# Patient Record
Sex: MALE | NOT HISPANIC OR LATINO | Employment: FULL TIME | URBAN - METROPOLITAN AREA
[De-identification: names, ages, dates, MRNs, and addresses within clinical notes are randomized per-mention and may not be internally consistent; named-entity substitution may affect disease eponyms.]

---
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
## 2019-01-16 ENCOUNTER — OFFICE VISIT - RIVER FALLS (OUTPATIENT)
Dept: FAMILY MEDICINE | Facility: CLINIC | Age: 19
End: 2019-01-16

## 2019-01-16 ASSESSMENT — MIFFLIN-ST. JEOR: SCORE: 1623.14

## 2019-07-26 ENCOUNTER — OFFICE VISIT - RIVER FALLS (OUTPATIENT)
Dept: FAMILY MEDICINE | Facility: CLINIC | Age: 19
End: 2019-07-26

## 2019-07-26 ASSESSMENT — MIFFLIN-ST. JEOR: SCORE: 1546.03

## 2020-01-29 ENCOUNTER — OFFICE VISIT - RIVER FALLS (OUTPATIENT)
Dept: FAMILY MEDICINE | Facility: CLINIC | Age: 20
End: 2020-01-29

## 2020-01-29 ASSESSMENT — MIFFLIN-ST. JEOR: SCORE: 1563.26

## 2021-08-11 ENCOUNTER — OFFICE VISIT - RIVER FALLS (OUTPATIENT)
Dept: FAMILY MEDICINE | Facility: CLINIC | Age: 21
End: 2021-08-11

## 2021-10-20 ENCOUNTER — COMMUNICATION - RIVER FALLS (OUTPATIENT)
Dept: FAMILY MEDICINE | Facility: CLINIC | Age: 21
End: 2021-10-20

## 2021-10-25 ENCOUNTER — COMMUNICATION - RIVER FALLS (OUTPATIENT)
Dept: FAMILY MEDICINE | Facility: CLINIC | Age: 21
End: 2021-10-25

## 2021-10-29 ENCOUNTER — COMMUNICATION - RIVER FALLS (OUTPATIENT)
Dept: FAMILY MEDICINE | Facility: CLINIC | Age: 21
End: 2021-10-29

## 2021-11-01 ENCOUNTER — COMMUNICATION - RIVER FALLS (OUTPATIENT)
Dept: FAMILY MEDICINE | Facility: CLINIC | Age: 21
End: 2021-11-01

## 2021-11-08 ENCOUNTER — OFFICE VISIT - RIVER FALLS (OUTPATIENT)
Dept: FAMILY MEDICINE | Facility: CLINIC | Age: 21
End: 2021-11-08

## 2022-02-12 VITALS
SYSTOLIC BLOOD PRESSURE: 120 MMHG | DIASTOLIC BLOOD PRESSURE: 50 MMHG | TEMPERATURE: 98.2 F | DIASTOLIC BLOOD PRESSURE: 54 MMHG | HEART RATE: 76 BPM | HEART RATE: 64 BPM | SYSTOLIC BLOOD PRESSURE: 90 MMHG | WEIGHT: 149.6 LBS | OXYGEN SATURATION: 97 % | TEMPERATURE: 98.3 F | BODY MASS INDEX: 22.76 KG/M2 | WEIGHT: 145 LBS | HEIGHT: 67 IN | HEIGHT: 67 IN | BODY MASS INDEX: 23.48 KG/M2

## 2022-02-12 VITALS
BODY MASS INDEX: 22.3 KG/M2 | WEIGHT: 142.4 LBS | TEMPERATURE: 96.9 F | BODY MASS INDEX: 23.17 KG/M2 | SYSTOLIC BLOOD PRESSURE: 120 MMHG | DIASTOLIC BLOOD PRESSURE: 64 MMHG | HEIGHT: 67 IN | SYSTOLIC BLOOD PRESSURE: 122 MMHG | WEIGHT: 141.4 LBS | HEART RATE: 76 BPM | WEIGHT: 147.6 LBS | HEART RATE: 76 BPM | HEART RATE: 72 BPM | TEMPERATURE: 97.4 F | DIASTOLIC BLOOD PRESSURE: 60 MMHG | SYSTOLIC BLOOD PRESSURE: 92 MMHG | TEMPERATURE: 97.3 F | BODY MASS INDEX: 22.15 KG/M2 | DIASTOLIC BLOOD PRESSURE: 80 MMHG

## 2022-02-12 VITALS
OXYGEN SATURATION: 97 % | DIASTOLIC BLOOD PRESSURE: 78 MMHG | SYSTOLIC BLOOD PRESSURE: 122 MMHG | WEIGHT: 169.7 LBS | HEART RATE: 77 BPM | BODY MASS INDEX: 26.58 KG/M2

## 2022-02-12 VITALS
WEIGHT: 134.4 LBS | SYSTOLIC BLOOD PRESSURE: 110 MMHG | HEIGHT: 67 IN | BODY MASS INDEX: 21.09 KG/M2 | HEART RATE: 75 BPM | DIASTOLIC BLOOD PRESSURE: 60 MMHG

## 2022-02-12 VITALS
BODY MASS INDEX: 21.91 KG/M2 | HEIGHT: 67 IN | DIASTOLIC BLOOD PRESSURE: 56 MMHG | WEIGHT: 139.6 LBS | TEMPERATURE: 98.6 F | HEART RATE: 60 BPM | SYSTOLIC BLOOD PRESSURE: 110 MMHG

## 2022-02-12 VITALS
BODY MASS INDEX: 22.85 KG/M2 | TEMPERATURE: 98.1 F | HEART RATE: 80 BPM | SYSTOLIC BLOOD PRESSURE: 120 MMHG | DIASTOLIC BLOOD PRESSURE: 62 MMHG | HEIGHT: 67 IN | WEIGHT: 145.6 LBS

## 2022-02-12 VITALS
BODY MASS INDEX: 20.5 KG/M2 | HEART RATE: 70 BPM | DIASTOLIC BLOOD PRESSURE: 62 MMHG | SYSTOLIC BLOOD PRESSURE: 122 MMHG | HEIGHT: 67 IN | WEIGHT: 130.6 LBS

## 2022-02-15 NOTE — PROGRESS NOTES
Patient:   ANTONIA SOUTH            MRN: [id number]            FIN: [id number]               Age:   18 years     Sex:  Male     :  2000   Associated Diagnoses:   Moderate major depression   Author:   Na Torres MD      Chief Complaint   follow up depression      Interval History   patient here for depression follow up  has been feeling like medication is working  looking for a full time job, planning to go back to school fall   has recovered well from recent appendicitis         Health Status   Allergies:    Allergic Reactions (All)  No known allergies  No Known Medication Allergies   Medications:  (Selected)   Prescriptions  Prescribed  FLUoxetine 20 mg oral capsule: = 1 cap(s) ( 20 mg ), PO, Daily, # 30 cap(s), 1 Refill(s), Type: Maintenance, Pharmacy: [id number], 1 cap(s) Oral daily  Wellbutrin  mg/24 hours oral tablet, extended release: = 1 tab(s) ( 150 mg ), Oral, q 24 hrs, # 90 tab(s), 1 Refill(s), Type: Maintenance, Pharmacy: [facility] [id number], 1 tab(s) Oral q 24 hrs   Problem list:    All Problems  Bell's Palsy / ICD-9-.0 / Confirmed  Moderate major depression / SNOMED CT 9948053 / Confirmed      Histories   Past Medical History:    Active  Bell's Palsy (ICD-9-.0)   Family History:    Alive and well  Mother (Jennifer)  Father (Tristin)     Procedure history:    PE tube on 5/10/2006 at 6 Years.  Comments:  2010 11:31 AM CST - Leigh Ann Boudreaux  Left   Social History:        Alcohol Assessment: Denies Alcohol Use            Never      Tobacco Assessment: Denies Tobacco Use            Electronic Cigarettes      Substance Abuse Assessment: Current            Marijuana      Employment and Education Assessment            Student      Home and Environment Assessment            Lives with Father, Mother, Siblings.      Nutrition and Health Assessment            Type of diet: Regular.      Exercise and Physical Activity Assessment: Does not exercise       Physical Examination   Vital Signs   12/10/2018 1:07 PM CST Temperature Tympanic 96.9 DegF  LOW    Peripheral Pulse Rate 76 bpm    Pulse Site Radial artery    HR Method Manual    Systolic Blood Pressure 92 mmHg    Diastolic Blood Pressure 60 mmHg    Mean Arterial Pressure 71 mmHg    BP Site Left arm    BP Method Manual      Measurements from flowsheet : Measurements   12/10/2018 1:07 PM CST   Weight Measured - Standard                141.4 lb     General:  Alert and oriented, No acute distress.    Psychiatric:  Cooperative, Appropriate mood & affect.       Impression and Plan   Diagnosis     Moderate major depression (AHY01-IT F32.1).     Plan:  improving, will stop fluoxetine and continue wellbutrin. Follow up if not getting better..

## 2022-02-15 NOTE — NURSING NOTE
CAGE Assessment Entered On:  1/29/2020 9:20 AM CST    Performed On:  1/29/2020 9:20 AM CST by Kassidy Chicas CMA               Assessment   Have you ever felt you should cut down on your drinking :   No   Have people annoyed you by criticizing your drinking :   No   Have you ever felt bad or guilty about your drinking :   Yes   Have you ever taken a drink first thing in the morning to steady your nerves or get rid of a hangover (Eye-opener) :   No   CAGE Score :   1    Kassidy Chicas CMA - 1/29/2020 9:20 AM CST

## 2022-02-15 NOTE — NURSING NOTE
Generalized Anxiety Disorder Screening Entered On:  1/29/2020 9:21 AM CST    Performed On:  1/29/2020 9:20 AM CST by Kassidy Chicas CMA               Generalized Anxiety Disorder Screening   PERLA Nervous, Anxious On Edge :   Nearly every day   PERLA Control Worrying B :   Nearly every day   PERLA Worrying Too Much :   Nearly every day   PERLA Restless :   Several days   PERLA Easily Annoyed/Irritable :   Not at all   PERLA Afraid :   Not at all   PERLA Trouble Relaxing :   More than half the days   PERLA Total Screening Score :   12    PERLA Difficulty with Work, Home, Others :   Somewhat difficult   Kassidy Chicas CMA - 1/29/2020 9:20 AM CST

## 2022-02-15 NOTE — NURSING NOTE
CAGE Assessment Entered On:  8/15/2021 8:42 AM CDT    Performed On:  8/11/2021 8:41 AM CDT by Flory Saavedra               Assessment   Have you ever felt you should cut down on your drinking :   Yes   Have people annoyed you by criticizing your drinking :   No   Have you ever felt bad or guilty about your drinking :   Yes   Have you ever taken a drink first thing in the morning to steady your nerves or get rid of a hangover (Eye-opener) :   Yes   CAGE Score :   3    Flory Saaevdra - 8/15/2021 8:41 AM CDT

## 2022-02-15 NOTE — PROGRESS NOTES
Patient:   ANTONIA SOUTH            MRN: [id number]            FIN: [id number]               Age:   18 years     Sex:  Male     :  2000   Associated Diagnoses:   Severe major depression   Author:   Na Torres MD      Chief Complaint   2018 1:28 PM CST   Depression Med Check; has not noticed a change        History of Present Illness   patient here for follow up on depression  had felt initially this summer like he was improving, then as school started noticed increasing symptoms  have been getting progressively more disruptive  had to drop out of school this semester  starting counseling next week  has had passing suicidal thoughts but no active plan, feels like he would reach out if needed, no access to firearms or other weapons      Health Status   Allergies:    Allergic Reactions (All)  No known allergies  No Known Medication Allergies   Medications:  (Selected)   Prescriptions  Prescribed  FLUoxetine 40 mg oral capsule: 1 cap(s) ( 40 mg ), Oral, daily, # 90 cap(s), 1 Refill(s), Type: Maintenance, Pharmacy: [id number], 1 cap(s) Oral daily   Problem list:    All Problems  Bell's Palsy / ICD-9-.0 / Confirmed  Moderate major depression / SNOMED CT 4631319 / Confirmed      Histories   Past Medical History:    Active  Bell's Palsy (ICD-9-.0)   Procedure history:    PE tube on 5/10/2006 at 6 Years.  Comments:  2010 11:31 AM - Leigh Ann Boudreaux  Left      Physical Examination   Vital Signs   2018 1:28 PM CST Temperature Tympanic 98.1 DegF    Peripheral Pulse Rate 80 bpm    Pulse Site Radial artery    HR Method Manual    Systolic Blood Pressure 120 mmHg    Diastolic Blood Pressure 62 mmHg    Mean Arterial Pressure 81 mmHg    BP Site Left arm    BP Method Manual      Measurements from flowsheet : Measurements   2018 1:28 PM CST Height Measured - Standard 67 in    Weight Measured - Standard 145.6 lb    BSA 1.77 m2    Body Mass Index 22.8 kg/m2    Body Mass Index  Percentile 48.52      General:  Alert and oriented, Mild distress.    Psychiatric:  cooperative, answers questions appropriately, poor eye contact, affect is flat.       Impression and Plan   Diagnosis     Severe major depression (HNL35-IF F32.2).     Course:  Worsening.    Plan:  will taper fluoxetine and add wellbutrin, follow up in 2-3 weeks.    Orders     Orders (Selected)   Prescriptions  Prescribed  FLUoxetine 20 mg oral capsule: = 1 cap(s) ( 20 mg ), PO, Daily, # 30 cap(s), 1 Refill(s), Type: Maintenance, Pharmacy: [facility] [id number], 1 cap(s) Oral daily  Wellbutrin  mg/24 hours oral tablet, extended release: = 1 tab(s) ( 150 mg ), Oral, q 24 hrs, # 30 tab(s), 1 Refill(s), Type: Maintenance, Pharmacy: [facility] [id number], 1 tab(s) Oral q 24 hrs.

## 2022-02-15 NOTE — NURSING NOTE
Comprehensive Intake Entered On:  1/16/2019 2:00 PM CST    Performed On:  1/16/2019 1:56 PM CST by Kassidy Chicas CMA               Summary   Chief Complaint :   Depression med check; no change noticed   Menstrual Status :   N/A   Weight Measured :   147.6 lb(Converted to: 147 lb 10 oz, 66.95 kg)    Height Measured :   67 in(Converted to: 5 ft 7 in, 170.18 cm)    Body Mass Index :   23.11 kg/m2   Body Surface Area :   1.78 m2   Systolic Blood Pressure :   120 mmHg   Diastolic Blood Pressure :   64 mmHg   Mean Arterial Pressure :   83 mmHg   Peripheral Pulse Rate :   72 bpm   BP Site :   Left arm   Pulse Site :   Radial artery   BP Method :   Manual   HR Method :   Manual   Temperature Tympanic :   97.4 DegF(Converted to: 36.3 DegC)  (LOW)    Kassidy Chicas CMA - 1/16/2019 1:56 PM CST   Health Status   Allergies Verified? :   Yes   Medication History Verified? :   Yes   Immunizations Current :   Yes   Medical History Verified? :   Yes   Pre-Visit Planning Status :   Completed   Tobacco Use? :   Never smoker   Kassidy Chicas CMA - 1/16/2019 1:56 PM CST   Consents   Consent for Immunization Exchange :   Consent Granted   Consent for Immunizations to Providers :   Consent Granted   Kassidy Chcias CMA - 1/16/2019 1:56 PM CST   Meds / Allergies   (As Of: 1/16/2019 2:00:22 PM CST)   Allergies (Active)   No known allergies  Estimated Onset Date:   Unspecified ; Created By:   Dianna Cain CMA; Reaction Status:   Active ; Category:   Drug ; Substance:   No known allergies ; Type:   Allergy ; Updated By:   Dianna Cain CMA; Reviewed Date:   1/16/2019 1:58 PM CST      No Known Medication Allergies  Estimated Onset Date:   Unspecified ; Created By:   Dianna Cain CMA; Reaction Status:   Active ; Category:   Drug ; Substance:   No Known Medication Allergies ; Type:   Allergy ; Updated By:   Dianna Cain CMA; Reviewed Date:   1/16/2019 1:58 PM CST        Medication List   (As Of: 1/16/2019 2:00:22 PM CST)   Prescription/Discharge  Order    buPROPion  :   buPROPion ; Status:   Prescribed ; Ordered As Mnemonic:   Wellbutrin  mg/24 hours oral tablet, extended release ; Simple Display Line:   150 mg, 1 tab(s), Oral, q 24 hrs, 90 tab(s), 1 Refill(s) ; Ordering Provider:   Na Torres MD; Catalog Code:   buPROPion ; Order Dt/Tm:   12/10/2018 1:13:22 PM          FLUoxetine  :   FLUoxetine ; Status:   Prescribed ; Ordered As Mnemonic:   FLUoxetine 20 mg oral capsule ; Simple Display Line:   20 mg, 1 cap(s), PO, Daily, 30 cap(s), 1 Refill(s) ; Ordering Provider:   Na Torres MD; Catalog Code:   FLUoxetine ; Order Dt/Tm:   11/23/2018 1:43:47 PM

## 2022-02-15 NOTE — PROGRESS NOTES
Patient:   ANTONIA SOUTH            MRN: [id number]            FIN: [id number]               Age:   21 years     Sex:  Male     :  2000   Associated Diagnoses:   Moderate major depression; Atypical depression   Author:   Na Torres MD      Chief Complaint   2021 8:36 AM CST    VIDEO VISIT - Follow-Up, Pt needs a psych referral.     video visit: patient at home  provider in office  started 848 am  ended 901 am  consent for video visit documented as noted      Interval History   patient with increased depression symptoms  has been drinking alcohol for treatment/self medication  drinking daily 6-7 drinks  seeing a counselor, just resumed in person, seen in [place], notes that was helpful  still working every day, not missing work  feels like work is helpful and distracting  has been talking with parents about his depression, they are supportive and checking on him regularly  notes symptoms have been worse for the past 6 months  had been tried on several medications as noted in history (fluoxetine, buspar, bupropion) without good effective  tolerating duloxetine, currently on 60mg daily  has tried to get scheduled with psychiatry in this area ([facility], [place]) but having a hard time finding someone who will take new patients  does not feel like he needs hospitalization or intensive treatment at this time         Health Status   Allergies:    Allergic Reactions (All)  No known allergies  No Known Medication Allergies   Medications:  (Selected)   Prescriptions  Prescribed  DULoxetine 30 mg oral delayed release capsule: = 1 cap(s) ( 30 mg ), Oral, bid, Instructions: take 1 caps daily x 1 week, then increase to 2 caps daily, # 180 cap(s), 1 Refill(s), Type: Maintenance, Pharmacy: [facility] #[id number], 1 cap(s) Oral bid,Instr:take 1 caps daily x 1 week, then inc...  Wellbutrin  mg/24 hours oral tablet, extended release: = 1 tab(s) ( 300 mg ), Oral, q 24 hrs, # 90 tab(s), 3 Refill(s),  Type: Maintenance, Pharmacy: [facility] #[id number], 1 tab(s) Oral q 24 hrs  busPIRone 15 mg oral tablet: = 1 tab(s) ( 15 mg ), Oral, bid, # 180 tab(s), 3 Refill(s), Type: Maintenance, Pharmacy: [facility] #[id number], 1 tab(s) Oral bid   Problem list:    All Problems  Bell's Palsy / ICD-9-.0 / Confirmed  Moderate major depression / SNOMED CT 3618278 / Confirmed  Tobacco user / SNOMED CT 310823744 / Probable      Histories   Past Medical History:    Active  Bell's Palsy (ICD-9-.0)   Family History:    Alive and well  Mother (Jennifer)  Father (Tristin)     Procedure history:    PE tube on 5/10/2006 at 6 Years.  Comments:  12/29/2010 11:31 AM KIKI - Leigh Ann Boudreaux   Social History:        Electronic Cigarette/Vaping Assessment            Electronic Cigarette Use: Use, within last 90 days.  Type: Nicotine infused.  1-25 inhales/day Use Per Day.      Alcohol Assessment: Denies Alcohol Use            Never      Tobacco Assessment: Denies Tobacco Use            Electronic Cigarettes            Smoker, current status unknown            Smoker, current status unknown      Substance Abuse Assessment: Current            Marijuana      Employment and Education Assessment            Student      Home and Environment Assessment            Lives with Father, Mother, Siblings.      Nutrition and Health Assessment            Type of diet: Regular.      Exercise and Physical Activity Assessment: Does not exercise        Physical Examination   Eye:  patient is alert, demonstrates good insight, contracts for safety, affect is flat.    Psychiatric:  Cooperative.       Impression and Plan   Diagnosis     Moderate major depression (IMH71-OB F32.1).     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     Course:  Poor response to treatment.    Plan:  will trial increase in duloxetine to 90mg daily while awaiting psychiatry referral, follow up with me in 2-3 weeks by video visit. Will ask [facility] to touch base with patient and  confirm that appointment gets scheduled with psychiatry. Patient will reach out to me if suicidal thoughts increase or he has concerns for his safety..    Orders     Orders (Selected)   Outpatient Orders  Ordered  Referral (Request): 11/08/21 8:59:00 CST, Referred to: Psychiatry, Referred to: depression not responding to medication, Moderate major depression  Atypical depression  Prescriptions  Prescribed  DULoxetine 30 mg oral delayed release capsule: See Instructions, Instructions: 2 cap(s) po qam and 1 cap po apm, # 90 tab(s), 5 Refill(s), Type: Maintenance, Pharmacy: [facility] #[id number], 2 cap(s) po qam and 1 cap po apm, 67, in, 01/29/20 8:56:00 CST, Height Measured, 169.7, lb, 08/11/21....

## 2022-02-15 NOTE — NURSING NOTE
CAGE Assessment Entered On:  7/26/2019 9:34 AM CDT    Performed On:  7/26/2019 9:34 AM CDT by Kassidy Chicas CMA               Assessment   Have you ever felt you should cut down on your drinking :   No   Have people annoyed you by criticizing your drinking :   No   Have you ever felt bad or guilty about your drinking :   No   Have you ever taken a drink first thing in the morning to steady your nerves or get rid of a hangover (Eye-opener) :   No   CAGE Score :   0    Kassidy Chicas CMA - 7/26/2019 9:34 AM CDT

## 2022-02-15 NOTE — LETTER
(Inserted Image. Unable to display)   July 16, 2019      ANTONIA SOUTH  [address] [place] [id number]        Dear ANTONIA,      Thank you for selecting [facility] (previously [facility]) for your healthcare needs.     Our records indicate you are due for the following services:     Medication Check    To schedule an appointment or if you have further questions, please contact your primary clinic:   [facility]          [phone]   [facility]    [phone]             [facility]         [phone]      Powered by [employer]    Sincerely,    Na Torres M.D.

## 2022-02-15 NOTE — NURSING NOTE
Generalized Anxiety Disorder Screening Entered On:  1/16/2019 2:57 PM CST    Performed On:  1/16/2019 2:56 PM CST by Kassidy Chicas CMA               Generalized Anxiety Disorder Screening   PERLA Nervous, Anxious On Edge :   Nearly every day   PERLA Control Worrying B :   Several days   PERLA Worrying Too Much :   Nearly every day   PERLA Restless :   Several days   PERLA Easily Annoyed/Irritable :   Several days   PERLA Afraid :   More than half the days   PERLA Trouble Relaxing :   Several days   PERLA Total Screening Score :   12    Kassidy Chicas CMA - 1/16/2019 2:56 PM CST

## 2022-02-15 NOTE — NURSING NOTE
CAGE Assessment Entered On:  1/16/2019 2:56 PM CST    Performed On:  1/16/2019 2:56 PM CST by Kassidy Chicas CMA               Assessment   Have you ever felt you should cut down on your drinking :   No   Have people annoyed you by criticizing your drinking :   No   Have you ever felt bad or guilty about your drinking :   No   Have you ever taken a drink first thing in the morning to steady your nerves or get rid of a hangover (Eye-opener) :   No   CAGE Score :   0    Kassidy Chicas CMA - 1/16/2019 2:56 PM CST

## 2022-02-15 NOTE — PROGRESS NOTES
Patient:   ANTONIA SOUTH            MRN: [id number]            FIN: [id number]               Age:   18 years     Sex:  Male     :  2000   Associated Diagnoses:   Moderate major depression; Depression with anxiety   Author:   Na Torres MD      Chief Complaint   2019 1:56 PM CST    Depression med check; no change noticed      Interval History   here for follow up depression and anxiety  feels like anxiety has been worse  racing thoughts, feels edgy, worries excessively  no trouble with sleeping         Health Status   Allergies:    Allergic Reactions (All)  No known allergies  No Known Medication Allergies   Medications:  (Selected)   Prescriptions  Prescribed  Wellbutrin  mg/24 hours oral tablet, extended release: = 1 tab(s) ( 150 mg ), Oral, q 24 hrs, # 90 tab(s), 1 Refill(s), Type: Maintenance, Pharmacy: [id number], 1 tab(s) Oral q 24 hrs   Problem list:    All Problems  Moderate major depression / SNOMED CT 6509410 / Confirmed  Bell's Palsy / ICD-9-.0 / Confirmed      Histories   Past Medical History:    Active  Bell's Palsy (ICD-9-.0)   Family History:    Alive and well  Mother (Jennifer)  Father (Tristin)     Procedure history:    PE tube on 5/10/2006 at 6 Years.  Comments:  2010 11:31 AM CST - Leigh Ann Boudreaux   Social History:        Alcohol Assessment: Denies Alcohol Use            Never      Tobacco Assessment: Denies Tobacco Use            Electronic Cigarettes      Substance Abuse Assessment: Current            Marijuana      Employment and Education Assessment            Student      Home and Environment Assessment            Lives with Father, Mother, Siblings.      Nutrition and Health Assessment            Type of diet: Regular.      Exercise and Physical Activity Assessment: Does not exercise      Physical Examination   Vital Signs   2019 1:56 PM CST Temperature Tympanic 97.4 DegF  LOW    Peripheral Pulse Rate 72 bpm    Pulse Site Radial  artery    HR Method Manual    Systolic Blood Pressure 120 mmHg    Diastolic Blood Pressure 64 mmHg    Mean Arterial Pressure 83 mmHg    BP Site Left arm    BP Method Manual      Measurements from flowsheet : Measurements   1/16/2019 1:56 PM CST Height Measured - Standard 67 in    Weight Measured - Standard 147.6 lb    BSA 1.78 m2    Body Mass Index 23.11 kg/m2    Body Mass Index Percentile 51.42      General:  Alert and oriented, No acute distress.    Psychiatric:  Cooperative, Appropriate mood & affect.    PHQ9 and GAD7 reviewed      Impression and Plan   Diagnosis     Moderate major depression (NSP38-SO F32.1).     Depression with anxiety (YBT79-QE F41.8).     Plan:  discussed option of increasing dose of wellbutrin vs adding something targeting anxiety. Requested second medication. Start buspar 7.5mg bid, call in one week and will up further to either tid dosing or 15mg bid depending on response..

## 2022-02-15 NOTE — NURSING NOTE
Depression Screening Entered On:  1/29/2020 9:20 AM CST    Performed On:  1/29/2020 9:20 AM CST by Kassidy Chicas CMA               Depression Screening   Little Interest - Pleasure in Activities :   More than half the days   Feeling Down, Depressed, Hopeless :   Nearly every day   Initial Depression Screen Score :   5    Trouble Falling or Staying Asleep :   Several days   Feeling Tired or Little Energy :   More than half the days   Poor Appetite or Overeating :   Nearly every day   Feeling Bad About Yourself :   Nearly every day   Trouble Concentrating :   Several days   Moving or Speaking Slowly :   More than half the days   Thoughts Better Off Dead or Hurting Self :   Not at all   Detailed Depression Screen Score :   12    Total Depression Screen Score :   17    PERLA Difficulty with Work, Home, Others :   Somewhat difficult   Kassidy Chicas CMA - 1/29/2020 9:20 AM CST

## 2022-02-15 NOTE — TELEPHONE ENCOUNTER
---------------------  From: Myriam South RN (Phone Messages Pool ([id number]_[place]))   To: HUNTER Message Pool ([id number]_[facility]);     Sent: 10/20/2021 12:00:15 PM CDT  Subject: General Message-referral        PCP:  HUNTER      Time of Call:  11:54am       Person Calling:  pt  Phone number:  [phone]    Note:   Pt returning call from Nery.     Chart reviewed; pt informed HUNTER placed a referral for psychiatry and can print a list of psychiatric options.    Pt requested to have a print out left at the  and will  when able.    Please assist with print out.    Last office visit and reason:  8/11/21 depression mgmt Edita @

## 2022-02-15 NOTE — NURSING NOTE
Comprehensive Intake Entered On:  1/29/2020 9:00 AM CST    Performed On:  1/29/2020 8:56 AM CST by Kassidy Chicas CMA               Summary   Chief Complaint :   Depression/Anxiety Med Check; last month was hard   Menstrual Status :   N/A   Weight Measured :   134.4 lb(Converted to: 134 lb 6 oz, 60.96 kg)    Height Measured :   67 in(Converted to: 5 ft 7 in, 170.18 cm)    Body Mass Index :   21.05 kg/m2   Body Surface Area :   1.7 m2   Systolic Blood Pressure :   110 mmHg   Diastolic Blood Pressure :   60 mmHg   Mean Arterial Pressure :   77 mmHg   Peripheral Pulse Rate :   75 bpm   BP Method :   Manual   HR Method :   Electronic   Kassidy Chicas CMA - 1/29/2020 8:56 AM CST   Health Status   Allergies Verified? :   Yes   Medication History Verified? :   Yes   Immunizations Current :   Yes   Medical History Verified? :   Yes   Pre-Visit Planning Status :   Completed   Tobacco Use? :   Current every day smoker   Kassidy Chicas CMA - 1/29/2020 8:56 AM CST   Consents   Consent for Immunization Exchange :   Consent Granted   Consent for Immunizations to Providers :   Consent Granted   Kassidy Chicas CMA - 1/29/2020 8:56 AM CST   Meds / Allergies   (As Of: 1/29/2020 9:00:13 AM CST)   Allergies (Active)   No known allergies  Estimated Onset Date:   Unspecified ; Created By:   Dianna Cain CMA; Reaction Status:   Active ; Category:   Drug ; Substance:   No known allergies ; Type:   Allergy ; Updated By:   Dianna Cain CMA; Reviewed Date:   1/29/2020 8:58 AM CST      No Known Medication Allergies  Estimated Onset Date:   Unspecified ; Created By:   Dianna Cain CMA; Reaction Status:   Active ; Category:   Drug ; Substance:   No Known Medication Allergies ; Type:   Allergy ; Updated By:   Dianna Cain CMA; Reviewed Date:   1/29/2020 8:58 AM CST        Medication List   (As Of: 1/29/2020 9:00:13 AM CST)   Prescription/Discharge Order    buPROPion  :   buPROPion ; Status:   Prescribed ; Ordered As Mnemonic:   Wellbutrin   mg/24 hours oral tablet, extended release ; Simple Display Line:   150 mg, 1 tab(s), Oral, q 24 hrs, 90 tab(s), 3 Refill(s) ; Ordering Provider:   Na Torres MD; Catalog Code:   buPROPion ; Order Dt/Tm:   7/26/2019 9:20:21 AM CDT          busPIRone  :   busPIRone ; Status:   Prescribed ; Ordered As Mnemonic:   busPIRone 10 mg oral tablet ; Simple Display Line:   10 mg, 1 tab(s), Oral, bid, 180 tab(s), 3 Refill(s) ; Ordering Provider:   Na Torres MD; Catalog Code:   busPIRone ; Order Dt/Tm:   7/26/2019 9:20:42 AM CDT

## 2022-02-15 NOTE — PROGRESS NOTES
Patient:   ANTONIA SOUTH            MRN: [id number]            FIN: [id number]               Age:   18 years     Sex:  Male     :  2000   Associated Diagnoses:   Moderate major depression   Author:   Na Torres MD      Chief Complaint   2018 5:27 PM CDT    F/U Depression; medication seems to be working really well        Interval History   here for follow up depression  overall working well, improved but not resolved  reviewed PHQ9      Health Status   Allergies:    Allergic Reactions (All)  No known allergies  No Known Medication Allergies   Medications:  (Selected)   Prescriptions  Completed  FLUoxetine 20 mg oral capsule: 1 cap(s) ( 20 mg ), PO, Daily, # 30 cap(s), 1 Refill(s), Type: Hard Stop, Pharmacy: [facility] [id number]   Problem list:    All Problems  Bell's Palsy / ICD-9-.0 / Confirmed      Histories   Past Medical History:    Active  Bell's Palsy (ICD-9-.0)   Family History:    Alive and well  Mother (Jennifer)  Father (Tristin)     Procedure history:    PE tube on 5/10/2006 at 6 Years.  Comments:  2010 11:31 AM - Leigh Ann Boudreaux  Left   Social History:        Alcohol Assessment: Denies Alcohol Use            Never      Tobacco Assessment: Denies Tobacco Use            Electronic Cigarettes      Substance Abuse Assessment: Current            Marijuana      Employment and Education Assessment            Student      Home and Environment Assessment            Lives with Father, Mother, Siblings.      Nutrition and Health Assessment            Type of diet: Regular.      Exercise and Physical Activity Assessment: Does not exercise        Physical Examination   Vital Signs   2018 5:27 PM CDT Temperature Tympanic 98.2 DegF    Peripheral Pulse Rate 64 bpm    Pulse Site Radial artery    HR Method Manual    Systolic Blood Pressure 90 mmHg    Diastolic Blood Pressure 54 mmHg  LOW    Mean Arterial Pressure 66 mmHg    BP Site Left arm    BP Method Manual      Measurements from  flowsheet : Measurements   7/24/2018 5:27 PM CDT Height Measured - Standard 67 in    Weight Measured - Standard 145 lb    BSA 1.76 m2    Body Mass Index 22.71 kg/m2    Body Mass Index Percentile 49.68      General:  Alert and oriented, No acute distress.    Psychiatric:  Cooperative, Appropriate mood & affect, Normal judgment, Non-suicidal.       Impression and Plan   Diagnosis     Moderate major depression (IPU05-OT F32.1).     Course:  Improving.    Plan:  will increase to 40mg, follow up by phone in 30 days. Recheck in clinic in 6 months.    Orders     Orders (Selected)   Prescriptions  Prescribed  FLUoxetine 40 mg oral capsule: 1 cap(s) ( 40 mg ), Oral, daily, # 90 cap(s), 1 Refill(s), Type: Maintenance, Pharmacy: [id number], 1 cap(s) Oral daily.

## 2022-02-15 NOTE — NURSING NOTE
Depression Screening Entered On:  7/26/2019 9:34 AM CDT    Performed On:  7/26/2019 9:33 AM CDT by Kassidy Chicas CMA               Depression Screening   Little Interest - Pleasure in Activities :   More than half the days   Feeling Down, Depressed, Hopeless :   Several days   Initial Depression Screen Score :   3    Trouble Falling or Staying Asleep :   Several days   Feeling Tired or Little Energy :   More than half the days   Poor Appetite or Overeating :   Nearly every day   Feeling Bad About Yourself :   More than half the days   Trouble Concentrating :   Several days   Moving or Speaking Slowly :   Several days   Thoughts Better Off Dead or Hurting Self :   Not at all   Detailed Depression Screen Score :   10    Total Depression Screen Score :   13    PERLA Difficulty with Work, Home, Others :   Somewhat difficult   Kassidy Chicas CMA - 7/26/2019 9:33 AM CDT

## 2022-02-15 NOTE — PROGRESS NOTES
Patient:   ANTONIA SOUTH            MRN: [id number]            FIN: [id number]               Age:   21 years     Sex:  Male     :  2000   Associated Diagnoses:   Moderate major depression   Author:   Na Torres MD      Chief Complaint   2021 9:45 AM CDT    Depression medication check. Stopped both medications 1 year ago.      Interval History   patient here for depression follow up  not taking medication  symptoms are severe  has not seen therapist in a while  would like to try alternate medication  denies active suicidal thoughts  did not feel like bupropion or buspar were effective  discussed option of psychiatry referral, does not want to proceed at this time but will consider in the future  will start back in therapy         Health Status   Allergies:    Allergic Reactions (All)  No known allergies  No Known Medication Allergies   Medications:  (Selected)   Prescriptions  Prescribed  Wellbutrin  mg/24 hours oral tablet, extended release: = 1 tab(s) ( 300 mg ), Oral, q 24 hrs, # 90 tab(s), 3 Refill(s), Type: Maintenance, Pharmacy: [facility] #[id number], 1 tab(s) Oral q 24 hrs  busPIRone 15 mg oral tablet: = 1 tab(s) ( 15 mg ), Oral, bid, # 180 tab(s), 3 Refill(s), Type: Maintenance, Pharmacy: [facility] #[id number], 1 tab(s) Oral bid   Problem list:    All Problems  Bell's Palsy / ICD-9-.0 / Confirmed  Moderate major depression / SNOMED CT 1439140 / Confirmed  Tobacco user / SNOMED CT 015436925 / Probable      Histories   Past Medical History:    Active  Bell's Palsy (ICD-9-.0)   Family History:    Alive and well  Mother (Jennifer)  Father (Tristin)     Procedure history:    PE tube on 5/10/2006 at 6 Years.  Comments:  2010 11:31 AM CST - Leigh Ann Boudreaux  Left   Social History:        Electronic Cigarette/Vaping Assessment            Electronic Cigarette Use: Use, within last 90 days.  Type: Nicotine infused.  1-25 inhales/day Use Per Day.      Alcohol Assessment:  Denies Alcohol Use            Never      Tobacco Assessment: Denies Tobacco Use            Electronic Cigarettes            Smoker, current status unknown            Smoker, current status unknown      Substance Abuse Assessment: Current            Marijuana      Employment and Education Assessment            Student      Home and Environment Assessment            Lives with Father, Mother, Siblings.      Nutrition and Health Assessment            Type of diet: Regular.      Exercise and Physical Activity Assessment: Does not exercise        Physical Examination   Vital Signs   8/11/2021 9:45 AM CDT Peripheral Pulse Rate 77 bpm    Systolic Blood Pressure 122 mmHg    Diastolic Blood Pressure 78 mmHg    Mean Arterial Pressure 93 mmHg    BP Site Right arm    BP Method Manual    Oxygen Saturation 97 %      Measurements from flowsheet : Measurements   8/11/2021 9:45 AM CDT    Weight Measured - Standard                169.7 lb        Impression and Plan   Diagnosis     Moderate major depression (CBM10-ZL F32.1).     Course:  Worsening.    Plan:  needs to restart medication, will trial duloxetine, follow up in 3-4 weeks, sooner if woresning, restart therapy.    Orders     Orders (Selected)   Prescriptions  Prescribed  DULoxetine 30 mg oral delayed release capsule: = 1 cap(s) ( 30 mg ), Oral, bid, Instructions: take 1 caps daily x 1 week, then increase to 2 caps daily, # 60 cap(s), 1 Refill(s), Type: Maintenance, Pharmacy: [facility] #[id number], 1 cap(s) Oral bid,Instr:take 1 caps daily x 1 week, then incr....

## 2022-02-15 NOTE — PROGRESS NOTES
Patient:   ANTONIA SOUTH            MRN: [id number]            FIN: [id number]               Age:   17 years     Sex:  Male     :  2000   Associated Diagnoses:   Right inguinal pain   Author:   Curt Hammond PA-C      Subjective   Chief complaint 2017 3:26 PM CDT    Lump in groin, swelling and sore; has happened on two seperate occasions  .     Right inguinal region.       Health Status   Allergies:    Allergic Reactions (All)  No known allergies   Problem list:    All Problems  Bell's Palsy / ICD-9-.0 / Confirmed      Objective   Vital Signs   2017 3:26 PM CDT Temperature Temporal 98.6 DegF    Peripheral Pulse Rate 60 bpm    Pulse Site Radial artery    HR Method Manual    Systolic Blood Pressure 110 mmHg    Diastolic Blood Pressure 56 mmHg  LOW    Mean Arterial Pressure 74 mmHg    BP Site Left arm    BP Method Manual      Measurements from flowsheet : Measurements   2017 3:26 PM CDT Height Measured - Standard 67 in    Weight Measured - Standard 139.6 lb    BSA 1.73 m2    Body Mass Index 21.86 kg/m2    Body Mass Index Percentile 47.34      Genitourinary:  No costovertebral angle tenderness, No scrotal tenderness.    Lymphatics:  Inguinal tenderness right inferior with 4mm soft tissue area of fullness. No genital lesions or rashes..       Impression and Plan   Assessment and Plan:          Diagnosis: Right inguinal pain (NWH18-MU R10.30).    Orders     Orders (Selected)   Outpatient Orders  Ordered  US Inguinal (Request): Priority: Routine, Right inguinal pain.     .    FU after US>

## 2022-02-15 NOTE — TELEPHONE ENCOUNTER
---------------------  From: Soila Sterling RN (Phone Messages Pool ([id number]_[place]))   To: [facility] Message Pool ([id number]);     Sent: 10/25/2021 3:12:40 PM CDT  Subject: Psyc referral     Time of Call:  1308  Return call at:_     Person Calling:  Patient  Phone number:  [phone]    Note:   Patient is wondering who to see for psyc referral. He must not have picked up the information at the front for him. Message left to call back at his number to clarify. Printed list of mental health providers is a put at front for  today.    Last office visit and reason:  8/11/21 Depression---------------------  From: Na Torres MD ([employer] Message Pool ([id number]_[facility]))   To: Na Torres MD;     Sent: 10/26/2021 1:12:49 PM CDT  Subject: FW: Psyc referralLeft message for patient. Will mail information to him today.

## 2022-02-15 NOTE — LETTER
(Inserted Image. Unable to display)   June 19, 2019      ANTONIA SOUTH  [address] [place] [id number]        Dear ANTONIA,      Thank you for selecting [facility] (previously Former[facility]) for your healthcare needs.     Our records indicate you are due for the following services:     Annual Physical    To schedule an appointment or if you have further questions, please contact your primary clinic:   [facility]          [phone]   Counts include 234 beds at the [facility]    [phone]             [facility]         [phone]      Powered by [employer]    Sincerely,    Na Torres M.D.

## 2022-02-15 NOTE — TELEPHONE ENCOUNTER
Entered by Rylee Hughes MA on January 28, 2019 2:55:42 PM CST  ---------------------  From: Rylee Hughes MA   To: [facility] [id number]    Sent: 1/28/2019 2:55:42 PM CST  Subject: Medication Management     ** Not Approved: Patient should contact Prescriber first **  FLUoxetine (FLUOXETINE 20MG CAPSULES)  TAKE 1 CAPSULE BY MOUTH DAILY  Qty:  30 cap(s)        Days Supply:  30        Refills:  0          MICHAEL     Route To Pharmacy - [facility] [id number]   Signed by Rylee Hughes MA            ------------------------------------------  From: [facility] [id number]  To: Na Torres MD  Sent: January 26, 2019 1:40:39 PM CST  Subject: Medication Management  Due: January 27, 2019 1:40:39 PM CST    ** On Hold Pending Signature **  Drug: FLUoxetine (FLUoxetine 20 mg oral capsule)  1 CAP(S) ORAL DAILY  Quantity: 30 cap(s)     Days Supply: 0         Refills: 0  Substitutions Allowed  Notes from Pharmacy:     Dispensed Drug: FLUoxetine (FLUoxetine 20 mg oral capsule)  TAKE 1 CAPSULE BY MOUTH DAILY  Quantity: 30 cap(s)     Days Supply: 30        Refills: 0  Substitutions Allowed  Notes from Pharmacy:   ------------------------------------------

## 2022-02-15 NOTE — TELEPHONE ENCOUNTER
---------------------  From: Kaye Robin LPN (Phone Messages Pool ([id number]))   To: [employer] Message Pool ([id number]_[facility]);     Sent: 10/20/2021 10:00:24 AM CDT  Subject: psychiatry referral     Phone Message    PCP:   HUNTER      Time of Call:  9:18am       Person Calling:  pt  Phone number:  [phone]    Note:   Pt LM asking for psychiatry referral/list of names. Pt says it was discussed at his last visit.    Last office visit and reason:  8/11/21 Depression Disease Management---------------------  From: Nery Osullivan ([employer] Message Pool ([id number]_[facility]))   To: Na Torres MD;     Sent: 10/20/2021 10:11:21 AM CDT  Subject: FW: psychiatry referral     Please advise  ** Submitted: **  Order:Referral (Request)  Details:  10/20/2021 10:57 AM CDT, Referred to: Psychiatry, Referred to: depression not responding to medication, Moderate major depression  Atypical depression         Signed by Na Torres MD  10/20/2021 3:57:00 PM [facility] placed referral. If he wants to try to schedule, we can give him the printout list of psychiatric options.---------------------  From: Na Torres MD   To: [employer] Message Pool ([id number]_[facility]);     Sent: 10/20/2021 10:58:30 AM CDT  Subject: RE: psychiatry referralLVMTCB.

## 2022-02-15 NOTE — PROGRESS NOTES
Patient:   ANTONIA SOUTH            MRN: [id number]            FIN: [id number]               Age:   18 years     Sex:  Male     :  2000   Associated Diagnoses:   Physical exam; Moderate major depression   Author:   Na Torres MD      Chief Complaint   2018 4:57 PM CDT    Physical      Well Adult History   Patient here for annual exam  Generally healh has been good throughout his life  Just finished freshman year of college  Concerns about mental health  PHQ9 reviewed, suspicious for depression  Patient notes that he feels like symptoms have been coming on for several years but worse through the past year  Mom had issues with depression as a teenager but wasn't treated  Patient tried counseling at college but wasn't helpful  Has not tried medication and is willing to start         Review of Systems   ROS reviewed as documented in chart      Health Status   Allergies:    Allergic Reactions (All)  No known allergies   Medications: no daily medications   Problem list:    All Problems  Bell's Palsy / ICD-9-.0 / Confirmed      Histories   Past Medical History:    Active  Bell's Palsy (ICD-9-.0)   Family History:    Alive and well  Mother (Jennifer)  Father (Tristin)     Procedure history:    PE tube on 5/10/2006 at 6 Years.  Comments:  2010 11:31 AM - Leigh Ann Boudreaux  Left   Social History:        Tobacco Assessment: No Risk            Household tobacco concerns: No.      Employment and Education Assessment            Student      Home and Environment Assessment            Lives with Father, Mother, Siblings.        Physical Examination   Vital Signs   2018 4:57 PM CDT Temperature Tympanic 98.3 DegF    Peripheral Pulse Rate 76 bpm    Pulse Site Radial artery    HR Method Manual    Systolic Blood Pressure 120 mmHg    Diastolic Blood Pressure 50 mmHg  LOW    Mean Arterial Pressure 73 mmHg    BP Site Right arm    BP Method Manual    Oxygen Saturation 97 %      Measurements from flowsheet :  Measurements   6/19/2018 4:57 PM CDT Height Measured - Standard 67 in    Weight Measured - Standard 149.6 lb    BSA 1.79 m2    Body Mass Index 23.43 kg/m2    Body Mass Index Percentile 59.28      General:  Alert and oriented, No acute distress.    Eye:  Pupils are equal, round and reactive to light, Normal conjunctiva.    HENT:  Normocephalic, Tympanic membranes are clear, Normal hearing, No pharyngeal erythema.    Neck:  Supple, Non-tender, No lymphadenopathy, No thyromegaly.    Respiratory:  Lungs are clear to auscultation, Respirations are non-labored, Breath sounds are equal.    Cardiovascular:  Normal rate, Regular rhythm.    Gastrointestinal:  Soft, Non-tender, Non-distended, Normal bowel sounds, No organomegaly.    Musculoskeletal:  Normal range of motion, No deformity.    Integumentary:  Warm, Dry.    Neurologic:  Alert, Oriented.       Health Maintenance      Recommendations     Pending (in the next year)        Due            Alcohol Misuse Screen (Male) due  06/19/18  and every 1  year(s)           Depression Screen (Male) due  06/19/18  and every 1  year(s)           HIV Screen (if sexually active) (Male) due  06/19/18  and every 1  year(s)           STD Counseling (if sexually active) (Male) due  06/19/18  and every 1  year(s)           Syphilis Screen (if sexually active) (Male) due  06/19/18  and every 1  year(s)     Satisfied (in the past 1 year)        Satisfied            Body Mass Index Check (Male) on  06/19/18.           High Blood Pressure Screen (Male) on  06/19/18.           Tobacco Use Screen (Male) on  06/19/18.        Impression and Plan   Diagnosis     Physical exam (SYG10-LG Z00.00).     Course:  Progressing as expected.    Patient Instructions:       Counseled: Patient, Regarding diagnosis, exercise, healthy eating, sleep habits.    Diagnosis     Moderate major depression (HHV45-NB F32.1).     Course:  counseled regarding diagnosis treatment, denies suicidal thoughts, will follow up if  symptoms are worsening.    Plan:  will follow up in clinic in 3-4 weeks for recheck.    Orders     Orders (Selected)   Prescriptions  Prescribed  FLUoxetine 20 mg oral capsule: 1 cap(s) ( 20 mg ), PO, Daily, # 30 cap(s), 1 Refill(s), Type: Maintenance, Pharmacy: [id number], 1 cap(s) po daily.

## 2022-02-15 NOTE — TELEPHONE ENCOUNTER
---------------------  From: Rylee Hughes MA ([employer] Pool ([id number]_[facility]))   To: Na Torres MD;     Sent: 1/28/2019 8:10:46 AM CST  Subject: FW: Medication Management   Due Date/Time: 1/27/2019 1:40:00 PM CST     PCP:   CHT    Medication:   Fluoxetine  Last Filled:  11/23/18   Quantity:  30 Refills:  1    Date of last office visit and reason:   1/16/19  Date of last labs pertaining to condition:  n/a    Note:  Medication was completed of patients medication list.     Return to Clinic order placed?  Yes        ------------------------------------------  From: Jack Erwin [id number]  To: Na Torres MD  Sent: January 26, 2019 1:40:39 PM CST  Subject: Medication Management  Due: January 27, 2019 1:40:39 PM CST    ** On Hold Pending Signature **  Drug: FLUoxetine (FLUoxetine 20 mg oral capsule)  1 CAP(S) ORAL DAILY  Quantity: 30 cap(s)     Days Supply: 0         Refills: 0  Substitutions Allowed  Notes from Pharmacy:     Dispensed Drug: FLUoxetine (FLUoxetine 20 mg oral capsule)  TAKE 1 CAPSULE BY MOUTH DAILY  Quantity: 30 cap(s)     Days Supply: 30        Refills: 0  Substitutions Allowed  Notes from Pharmacy:   ------------------------------------------Please call patient. I have wellbutrin and buspar as his two current medications.---------------------  From: Na Torres MD   To: eRx Pool ([id number]_[facility]);     Sent: 1/28/2019 8:29:37 AM CST  Subject: RE: Medication ManagementCalled and left message for patient to clarify.

## 2022-02-15 NOTE — PROGRESS NOTES
Chief Complaint    Depression/Anxiety Med Check; last month was hard  History of Present Illness      patient with increased depression and anxiety symptoms over the past month      overall felt like bupropion has helped      buspirone seems to help somewhat but often forgets second dose      back in classes this week, finds school anxiety provoking but also feels like it is an accomplishment to be in school      PHQ9 and GAD7 reviewed as noted in chart  Physical Exam   Vitals & Measurements    HR: 75(Peripheral)  BP: 110/60     HT: 67 in  WT: 134.4 lb  BMI: 21.05       alert and cooperative, no distress  Assessment/Plan       1. Moderate major depression (F32.1)         will trial increase in both medications        bupropion 300mg daily        buspirone trial increase to 15mg twice a day        should consider counseling        follow up in one month if symptoms not controlled        10/15 minutes in counseling about medications, self-care                Orders:         buPROPion, = 1 tab(s) ( 300 mg ), Oral, q 24 hrs, # 90 tab(s), 3 Refill(s), Type: Maintenance, Pharmacy: [facility] #[id number], 1 tab(s) Oral q 24 hrs, (Ordered)         busPIRone, = 1 tab(s) ( 15 mg ), Oral, bid, # 180 tab(s), 3 Refill(s), Type: Maintenance, Pharmacy: [facility] #[id number], 1 tab(s) Oral bid, (Ordered)         influenza virus vaccine, inactivated, 0.5 mL, IM, once, (Completed)         tetanus/diphth/pertuss (Tdap) adult/adol, 0.5 mL, im, once, (Completed)         Return to Clinic (Request), RFV: Px and Anxiety/depression Med check, Return in 1 year  Patient Information     Name:ANTONIA SOUTH      Address:      [address] [place] [id number]     Sex:Male     YOB: 2000     [phone]     Emergency Contact:MARTHA SOUTH     MRN:[id number]     FIN:[id number]     Location:[facility]     Date of Service:01/29/2020      Primary Care Physician:       Felix CHAN,  [place], [phone]      Attending Physician:       Brian CHAN, Na, [phone]  Problem List/Past Medical History    Ongoing     Bell's Palsy     Moderate major depression    Historical     No qualifying data  Procedure/Surgical History     PE tube (05/10/2006)      Comments: Left.  Medications    busPIRone 15 mg oral tablet, 15 mg= 1 tab(s), Oral, bid, 3 refills    Wellbutrin  mg/24 hours oral tablet, extended release, 300 mg= 1 tab(s), Oral, q 24 hrs, 3 refills  Allergies    No Known Medication Allergies    No known allergies  Social History    Smoking Status - 01/29/2020     Current every day smoker     Alcohol - Denies Alcohol Use, 06/25/2018      Never, 06/25/2018     Employment/School      Student, 12/29/2010     Exercise - Does not exercise, 06/25/2018     Home/Environment      Lives with Father, Mother, Siblings., 12/29/2010     Nutrition/Health      Type of diet: Regular., 06/25/2018     Substance Abuse - Current, 06/25/2018      Marijuana, 06/25/2018     Tobacco - Denies Tobacco Use, 06/25/2018      Electronic Cigarettes, 06/25/2018  Family History    Alive and well: Mother and Father.  Immunizations      Vaccine Date Status          influenza virus vaccine, inactivated 01/29/2020 Given          tetanus/diphth/pertuss (Tdap) adult/adol 01/29/2020 Given          human papillomavirus vaccine 07/24/2018 Given          Hep A, pediatric/adolescent 05/17/2017 Given          Hep A, pediatric/adolescent 10/22/2015 Given              Comments : [10/22/2015] Dad consented Upper arm          meningococcal conjugate vaccine 10/22/2015 Given              Comments : [10/22/2015] Pt consented Lower arm          influenza (LAIV) 10/23/2014 Recorded          varicella 11/10/2013 Recorded          influenza (LAIV) 10/24/2013 Recorded          influenza (LAIV) 10/17/2012 Recorded          meningococcal conjugate vaccine 08/18/2011 Given          influenza 08/18/2011 Given          influenza (LAIV)  11/03/2010 Given          varicella 09/13/2010 Given              Comments : Pt father gave verbal consent.          Td 03/03/2010 Recorded          influenza, H1N1, live 12/07/2009 Recorded          DTaP 08/09/2004 Recorded          MMR (measles/mumps/rubella) 08/09/2004 Recorded          IPV 08/09/2004 Recorded          varicella 11/10/2003 Recorded          pneumococcal (PCV7) 08/10/2001 Recorded          pneumococcal (PCV7) 01/30/2001 Recorded          DTaP 07/24/2000 Recorded          IPV 07/24/2000 Recorded          MMR (measles/mumps/rubella) 02/08/2000 Recorded          Hib (HbOC) 02/08/2000 Recorded          DTaP 1999 Recorded          Hep B 1999 Recorded          Hib (HbOC) 1999 Recorded          DTaP 1999 Recorded          Hib (HbOC) 1999 Recorded          IPV 1999 Recorded          DTaP 1999 Recorded          Hep B 1999 Recorded          Hib (HbOC) 1999 Recorded          IPV 1999 Recorded          Hep B 1999 Recorded

## 2022-02-15 NOTE — NURSING NOTE
Comprehensive Intake Entered On:  8/11/2021 9:51 AM CDT    Performed On:  8/11/2021 9:45 AM CDT by Lucie Real CMA               Summary   Chief Complaint :   Depression medication check. Stopped both medications 1 year ago.    Menstrual Status :   N/A   Weight Measured :   169.7 lb(Converted to: 169 lb 11 oz, 76.975 kg)    Systolic Blood Pressure :   122 mmHg   Diastolic Blood Pressure :   78 mmHg   Mean Arterial Pressure :   93 mmHg   Peripheral Pulse Rate :   77 bpm   BP Site :   Right arm   BP Method :   Manual   Oxygen Saturation :   97 %   Lucie Real CMA - 8/11/2021 9:45 AM CDT   Health Status   Allergies Verified? :   Yes   Medication History Verified? :   Yes   Immunizations Current :   Yes   Medical History Verified? :   Yes   Pre-Visit Planning Status :   Completed   Tobacco Use? :   Current every day smoker   Lucie Real CMA - 8/11/2021 9:45 AM CDT   Consents   Consent for Immunization Exchange :   Consent Granted   Consent for Immunizations to Providers :   Consent Granted   Lucie Real CMA - 8/11/2021 9:45 AM CDT   Meds / Allergies   (As Of: 8/11/2021 9:51:35 AM CDT)   Allergies (Active)   No known allergies  Estimated Onset Date:   Unspecified ; Created By:   Dianna Cain CMA; Reaction Status:   Active ; Category:   Drug ; Substance:   No known allergies ; Type:   Allergy ; Updated By:   Dianna Cain CMA; Reviewed Date:   8/11/2021 9:48 AM CDT      No Known Medication Allergies  Estimated Onset Date:   Unspecified ; Created By:   Dianna Cain CMA; Reaction Status:   Active ; Category:   Drug ; Substance:   No Known Medication Allergies ; Type:   Allergy ; Updated By:   Dianna Cain CMA; Reviewed Date:   8/11/2021 9:48 AM CDT        Medication List   (As Of: 8/11/2021 9:51:35 AM CDT)   Prescription/Discharge Order    buPROPion  :   buPROPion ; Status:   Prescribed ; Ordered As Mnemonic:   Wellbutrin  mg/24 hours oral tablet, extended release ; Simple Display Line:   300 mg, 1 tab(s),  Oral, q 24 hrs, 90 tab(s), 3 Refill(s) ; Ordering Provider:   Na Torres MD; Catalog Code:   buPROPion ; Order Dt/Tm:   1/29/2020 9:11:37 AM CST          busPIRone  :   busPIRone ; Status:   Prescribed ; Ordered As Mnemonic:   busPIRone 15 mg oral tablet ; Simple Display Line:   15 mg, 1 tab(s), Oral, bid, 180 tab(s), 3 Refill(s) ; Ordering Provider:   Na Torres MD; Catalog Code:   busPIRone ; Order Dt/Tm:   1/29/2020 9:14:21 AM CST            Social History   Social History   (As Of: 8/11/2021 9:51:35 AM CDT)   Alcohol:  Denies Alcohol Use      Never   (Last Updated: 6/25/2018 11:48:54 AM CDT by Leigh Ann Boudreaux)          Tobacco:  Denies Tobacco Use      Electronic Cigarettes   (Last Updated: 6/25/2018 11:50:08 AM CDT by Leigh Ann Boudreaux)   Smoker, current status unknown   (Last Updated: 8/11/2021 9:48:56 AM CDT by Lucie Real CMA)   Smoker, current status unknown   (Last Updated: 8/11/2021 9:49:33 AM CDT by Lucie Real CMA)          Electronic Cigarette/Vaping:        Electronic Cigarette Use: Use, within last 90 days.  Type: Nicotine infused.  1-25 inhales/day Use Per Day.   (Last Updated: 8/11/2021 9:49:23 AM CDT by Lucie Real CMA)          Substance Abuse:  Current      Marijuana   (Last Updated: 6/25/2018 11:49:12 AM CDT by Leigh Ann Boudreaux)          Employment/School:        Student   (Last Updated: 12/29/2010 11:22:11 AM CST by Leigh Ann Boudreaux)          Home/Environment:        Lives with Father, Mother, Siblings.   (Last Updated: 12/29/2010 11:22:36 AM CST by Leigh Ann Boudreaux)          Nutrition/Health:        Type of diet: Regular.   (Last Updated: 6/25/2018 11:49:24 AM CDT by Leigh Ann Boudreaux)          Exercise:  Does not exercise      (Last Updated: 6/25/2018 11:49:17 AM CDT by Leigh Ann Boudreaux )

## 2022-02-15 NOTE — TELEPHONE ENCOUNTER
---------------------  From: Soila Sterling RN (Phone Messages Pool ([id number]_[place]))   To: [facility] Message Pool ([id number]_[facility]);     Sent: 11/1/2021 9:25:02 AM CDT  Subject: Depression symptoms     Time of Call:  0832  Return call at:0917     Person Calling:  Stanton Crow  Phone number:  [phone]    Note:   Dad wanted to share some information with [facility] regarding Emanuel. Emanuel is having depression. Dad feels his depression is severe. Dad is very worried about it and feels he may hurt himself.New RTC order entered. Routing to provider.---------------------  From: Kevin Cheung LPN ([facility] Message Pool ([id number]_[facility]))   To: Na Torres MD;     Sent: 11/1/2021 11:25:41 AM CDT  Subject: FW: Depression symptomsSpoke with dad at 1255pm  Reviewed that without patient permission I can't discuss patient history, etc. but I can receive information.  Dad notes that patient stated that he had had thoughts of self harm but would not act on them.    Patient is scheduled to see me next week.   Will watch for morning cancellations and if there are any will reach out to see if he wants to be seen sooner.  If dad has concerns between now and then, they should reach out and we will work him in.

## 2022-02-15 NOTE — NURSING NOTE
Comprehensive Intake Entered On:  11/8/2021 8:41 AM CST    Performed On:  11/8/2021 8:36 AM CST by Kevin Cheung LPN               Summary   Chief Complaint :   VIDEO VISIT - Follow-Up, Pt needs a psych referral.   Menstrual Status :   N/A   Kevin Cheung LPN - 11/8/2021 8:36 AM CST   Consents   Consent for Immunization Exchange :   Consent Granted   Consent for Immunizations to Providers :   Consent Granted   Kevin Cheung LPN - 11/8/2021 8:36 AM CST   Meds / Allergies   (As Of: 11/8/2021 8:41:06 AM CST)   Allergies (Active)   No known allergies  Estimated Onset Date:   Unspecified ; Created By:   Dianna Cain CMA; Reaction Status:   Active ; Category:   Drug ; Substance:   No known allergies ; Type:   Allergy ; Updated By:   Dianna Cain CMA; Reviewed Date:   11/8/2021 8:36 AM CST      No Known Medication Allergies  Estimated Onset Date:   Unspecified ; Created By:   Dianna Cain CMA; Reaction Status:   Active ; Category:   Drug ; Substance:   No Known Medication Allergies ; Type:   Allergy ; Updated By:   Dianna Cain CMA; Reviewed Date:   11/8/2021 8:36 AM CST        Medication List   (As Of: 11/8/2021 8:41:06 AM CST)   Prescription/Discharge Order    buPROPion  :   buPROPion ; Status:   Prescribed ; Ordered As Mnemonic:   Wellbutrin  mg/24 hours oral tablet, extended release ; Simple Display Line:   300 mg, 1 tab(s), Oral, q 24 hrs, 90 tab(s), 3 Refill(s) ; Ordering Provider:   Na Torres MD; Catalog Code:   buPROPion ; Order Dt/Tm:   1/29/2020 9:11:37 AM CST          busPIRone  :   busPIRone ; Status:   Prescribed ; Ordered As Mnemonic:   busPIRone 15 mg oral tablet ; Simple Display Line:   15 mg, 1 tab(s), Oral, bid, 180 tab(s), 3 Refill(s) ; Ordering Provider:   Na Torres MD; Catalog Code:   busPIRone ; Order Dt/Tm:   1/29/2020 9:14:21 AM CST          DULoxetine  :   DULoxetine ; Status:   Prescribed ; Ordered As Mnemonic:   DULoxetine 30 mg oral delayed release capsule ; Simple  Display Line:   30 mg, 1 cap(s), Oral, bid, take 1 caps daily x 1 week, then increase to 2 caps daily, 180 cap(s), 1 Refill(s) ; Ordering Provider:   Na Torres MD; Catalog Code:   DULoxetine ; Order Dt/Tm:   9/20/2021 10:46:55 AM CDT            Social History   Social History   (As Of: 11/8/2021 8:41:06 AM CST)   Alcohol:  Denies Alcohol Use      Never   (Last Updated: 6/25/2018 11:48:54 AM CDT by Leigh Ann Boudreaux)          Tobacco:  Denies Tobacco Use      Electronic Cigarettes   (Last Updated: 6/25/2018 11:50:08 AM CDT by Leigh Ann Boudreaux)   Smoker, current status unknown   (Last Updated: 8/11/2021 9:48:56 AM CDT by Lucie Real CMA)   Smoker, current status unknown   (Last Updated: 8/11/2021 9:49:33 AM CDT by Lucie Real CMA)          Electronic Cigarette/Vaping:        Electronic Cigarette Use: Use, within last 90 days.  Type: Nicotine infused.  1-25 inhales/day Use Per Day.   (Last Updated: 8/11/2021 9:49:23 AM CDT by Lucie Real CMA)          Substance Abuse:  Current      Marijuana   (Last Updated: 6/25/2018 11:49:12 AM CDT by Leigh Ann Boudreaux)          Employment/School:        Student   (Last Updated: 12/29/2010 11:22:11 AM CST by Leigh Ann Boudreaux)          Home/Environment:        Lives with Father, Mother, Siblings.   (Last Updated: 12/29/2010 11:22:36 AM CST by Leigh Ann Boudreaux)          Nutrition/Health:        Type of diet: Regular.   (Last Updated: 6/25/2018 11:49:24 AM CDT by Leigh Ann Boudreaux)          Exercise:  Does not exercise      (Last Updated: 6/25/2018 11:49:17 AM CDT by Leigh Ann Boudreaux )

## 2022-02-15 NOTE — LETTER
(Inserted Image. Unable to display)   January 29, 2021      ANTONIA SOUTH  [address] [place] [id number]        Dear ANTONIA,      Thank you for selecting [facility] (previously [facility]) for your healthcare needs.     Our records indicate you are due for the following services:     Annual Physical  Medication Check    (FYI   Regarding office visits: In some instances, a video visit or telephone visit may be offered as an option.)      To schedule an appointment or if you have further questions, please contact your clinic at [phone].      Powered by [employer]    Sincerely,    Na Torres M.D.

## 2022-02-15 NOTE — LETTER
(Inserted Image. Unable to display)   September 09, 2021  ANTONIA SOUTH  [address] [place] [address]        Dear ANTONIA,    Thank you for selecting [facility] for your healthcare needs.    Our records indicate you are due for the following services:     Follow-up office visit      (FYI   Regarding office visits: In some instances, a video visit or telephone visit may be offered as an option.)    To schedule an appointment or if you have further questions, please contact your clinic at [phone].    Powered by [employer]    Sincerely,    aN Torres MD

## 2022-02-15 NOTE — PROGRESS NOTES
Patient:   ANTONIA SOUTH            MRN: [id number]            FIN: [id number]               Age:   18 years     Sex:  Male     :  2000   Associated Diagnoses:   Appendicitis   Author:   Trenton Washington MD      Preoperative Information   Indication for surgery:  Acute abdomen.    Accompanied by:  No one.    Source of history:  Self.           Chief Complaint   2018 1:36 PM CST    Right sided lower abdominal pain x few hours     Chief complaint and symptoms noted above confirmed with patient.   Awoke with generalized abdominal pain and now worse with localization to RLQ.      Review of Systems   Constitutional:  Negative.    Eye:  Negative.    Ear/Nose/Mouth/Throat:  Negative.    Respiratory:  Negative.    Cardiovascular:  Negative.    Gastrointestinal:  Nausea, No vomiting, No diarrhea, No constipation, No heartburn.         Abdominal pain: Right, Lower quadrant, The severity is moderate, Characterized as ( Cramping/colicky ).    Genitourinary:  Negative.    Hematology/Lymphatics:  Negative.    Endocrine:  Negative.    Immunologic:  Negative.    Musculoskeletal:  Negative.    Integumentary:  Negative.    Neurologic:  Negative.    Psychiatric:  Depression, controlled.       Health Status   Allergies:    Allergic Reactions (Selected)  No known allergies  No Known Medication Allergies   Medications:  (Selected)   Prescriptions  Prescribed  FLUoxetine 20 mg oral capsule: = 1 cap(s) ( 20 mg ), PO, Daily, # 30 cap(s), 1 Refill(s), Type: Maintenance, Pharmacy: [id number], 1 cap(s) Oral daily  FLUoxetine 40 mg oral capsule: 1 cap(s) ( 40 mg ), Oral, daily, # 90 cap(s), 1 Refill(s), Type: Maintenance, Pharmacy: [id number], 1 cap(s) Oral daily  Wellbutrin  mg/24 hours oral tablet, extended release: = 1 tab(s) ( 150 mg ), Oral, q 24 hrs, # 30 tab(s), 1 Refill(s), Type: Maintenance, Pharmacy: [id number], 1 tab(s) Oral q 24 hrs   Problem list:    All  Problems  Bell's Palsy / ICD-9-.0 / Confirmed  Moderate major depression / SNOMED CT 1173719 / Confirmed      Histories   Past Medical History:    Active  Bell's Palsy (ICD-9-.0)   Family History:    Alive and well  Mother (Jennifer)  Father (Tristin)     Procedure history:    PE tube on 5/10/2006 at 6 Years.  Comments:  12/29/2010 11:31 AM CST - Janusz , Leigh Ann  Left   Social History:        Alcohol Assessment: Denies Alcohol Use            Never      Tobacco Assessment: Denies Tobacco Use            Electronic Cigarettes      Substance Abuse Assessment: Current            Marijuana      Employment and Education Assessment            Student      Home and Environment Assessment            Lives with Father, Mother, Siblings.      Nutrition and Health Assessment            Type of diet: Regular.      Exercise and Physical Activity Assessment: Does not exercise     LAST ATE OR DRANK  12/4/2018    Has no history of anemia.  Has no history of DVT or pulmonary embolism.  Has no personal history of bleeding problems.   Has no personal or family history of anesthesia reactions.  Patient does not have active tuberculosis.    S/he has not taken aspirin or aspirin containing products in the last week.     S/he has not taken Plavix (Clopidogrel) in the last 2 weeks.    S/he has not taken warfarin in the past week.    S/he has not been on corticosteroids for more than 2 weeks recently.      S/he is not DNR before, during or after surgery.    Chest pain / SOB walking up 2 flights of steps? NO  Pain in neck or jaw? NO  CAD MI?  NO  A fib? NO  Heart Failure? NO  Asthma  or Bronchitis? NO   Diabetes? NO       Insulin/Orals?   Seizure Disorder? NO  Chronic Kidney Disease? NO  Thyroid Disease? NO  Liver Disease NO  CVA? NO         Physical Examination   Vital Signs   12/5/2018 1:36 PM CST Temperature Tympanic 97.3 DegF  LOW    Peripheral Pulse Rate 76 bpm    Pulse Site Radial artery    HR Method Manual    Systolic Blood  Pressure 122 mmHg    Diastolic Blood Pressure 80 mmHg    Mean Arterial Pressure 94 mmHg    BP Site Right arm    BP Method Manual      General:  Alert and oriented, No acute distress.    Eye:  Normal conjunctiva.    HENT:  Normocephalic, Tympanic membranes are clear.    Neck:  Supple, Non-tender.    Respiratory:  Lungs are clear to auscultation, Respirations are non-labored.    Cardiovascular:  Normal rate, Regular rhythm, No murmur.    Gastrointestinal:          Abdomen: Right, Lower quadrant, Tenderness, Rebound tenderness, No guarding, Not rigid.         Bowel sounds: Diminished.    Genitourinary:  No costovertebral angle tenderness, No scrotal tenderness.    Lymphatics:  No lymphadenopathy neck, axilla, groin.    Musculoskeletal:  Normal range of motion, Normal strength.    Integumentary:  Warm, Dry, Pink.    Neurologic:  Alert, Oriented, Normal sensory, Normal motor function.    Psychiatric:  Cooperative, Appropriate mood & affect, Normal judgment, Non-suicidal.       Review / Management         Results review:  Lab results: 12/5/2018 2:06 PM CST    WBC TR                    22.5 x10^3/uL  .       Impression and Plan   Diagnosis     Appendicitis (GIF39-EC K37).     Condition:  Stable.    Orders     Arranged for father to come down and take him to [facility] c/o Dr. Dalton.  Dr. Dalton aware he is on his way..

## 2022-02-15 NOTE — NURSING NOTE
Comprehensive Intake Entered On:  7/26/2019 9:09 AM CDT    Performed On:  7/26/2019 9:05 AM CDT by Kassidy Chicas CMA               Summary   Chief Complaint :   follow-up depression; things are going good; wart on left foot   Menstrual Status :   N/A   Weight Measured :   130.6 lb(Converted to: 130 lb 10 oz, 59.24 kg)    Height Measured :   67 in(Converted to: 5 ft 7 in, 170.18 cm)    Body Mass Index :   20.45 kg/m2   Body Surface Area :   1.67 m2   Systolic Blood Pressure :   122 mmHg   Diastolic Blood Pressure :   62 mmHg   Mean Arterial Pressure :   82 mmHg   Peripheral Pulse Rate :   70 bpm   BP Method :   Manual   HR Method :   Manual   Kassidy Chicas CMA - 7/26/2019 9:05 AM CDT   Health Status   Allergies Verified? :   Yes   Medication History Verified? :   Yes   Immunizations Current :   Yes   Medical History Verified? :   Yes   Pre-Visit Planning Status :   Completed   Tobacco Use? :   Never smoker   Kassidy Chicas CMA - 7/26/2019 9:05 AM CDT   Consents   Consent for Immunization Exchange :   Consent Granted   Consent for Immunizations to Providers :   Consent Granted   Kassidy Chicas CMA - 7/26/2019 9:05 AM CDT   Meds / Allergies   (As Of: 7/26/2019 9:09:28 AM CDT)   Allergies (Active)   No known allergies  Estimated Onset Date:   Unspecified ; Created By:   Dianna Cain CMA; Reaction Status:   Active ; Category:   Drug ; Substance:   No known allergies ; Type:   Allergy ; Updated By:   Dianna Cain CMA; Reviewed Date:   7/26/2019 9:06 AM CDT      No Known Medication Allergies  Estimated Onset Date:   Unspecified ; Created By:   Dianna Cain CMA; Reaction Status:   Active ; Category:   Drug ; Substance:   No Known Medication Allergies ; Type:   Allergy ; Updated By:   Dianna Cain CMA; Reviewed Date:   7/26/2019 9:06 AM CDT        Medication List   (As Of: 7/26/2019 9:09:28 AM CDT)   Prescription/Discharge Order    buPROPion  :   buPROPion ; Status:   Prescribed ; Ordered As Mnemonic:   Wellbutrin  mg/24  hours oral tablet, extended release ; Simple Display Line:   150 mg, 1 tab(s), Oral, q 24 hrs, 90 tab(s), 1 Refill(s) ; Ordering Provider:   Na Torres MD; Catalog Code:   buPROPion ; Order Dt/Tm:   12/10/2018 1:13:22 PM          busPIRone  :   busPIRone ; Status:   Prescribed ; Ordered As Mnemonic:   busPIRone 7.5 mg oral tablet ; Simple Display Line:   7.5 mg, 1 tab(s), Oral, bid, 60 tab(s), 5 Refill(s) ; Ordering Provider:   Na Torres MD; Catalog Code:   busPIRone ; Order Dt/Tm:   1/16/2019 2:07:41 PM

## 2022-02-15 NOTE — NURSING NOTE
Depression Screening Entered On:  1/16/2019 2:56 PM CST    Performed On:  1/16/2019 2:55 PM CST by Kassidy Chicas CMA               Depression Screening   Feeling Down, Depressed, Hopeless :   More than half the days   Little Interest - Pleasure in Activities :   More than half the days   Initial Depression Screen Score :   4    Trouble Falling or Staying Asleep :   Nearly every day   Feeling Tired or Little Energy :   More than half the days   Poor Appetite or Overeating :   Nearly every day   Feeling Bad About Yourself :   More than half the days   Trouble Concentrating :   Several days   Moving or Speaking Slowly :   Several days   Thoughts Better Off Dead or Hurting Self :   Not at all   Detailed Depression Screen Score :   12    Total Depression Screen Score :   16    PERLA Difficulty with Work, Home, Others :   Extremely difficult   Kassidy Chicas CMA - 1/16/2019 2:55 PM CST

## 2022-02-15 NOTE — TELEPHONE ENCOUNTER
---------------------  From: Soila Sterling RN (Phone Messages Pool ([id number]_[facility]))   To: Phone Messages Pool (32224_[place]);     Sent: 10/29/2021 11:18:02 AM CDT  Subject: Referral Management     Time of Call:  1036  Return call at:1100     Person Calling:  patient  Phone number:  [phone]    Note:   Patient was unable to get in to [facility] Psychiatrist. He is not established with [facility]. I talked to our referral coordinator she suggests there is a new psychiatrist in [place]. Dr. Maile Anderson the number is . He can call this and be directed to schedule for Dr. Anderson. He felt like he would like to have a visit with Dr. Torres as well as it is taking so long to get into psychiatry. He is transferred to schedule with [facility]. He was comfortable with an appointment next week. He feels it is not an emergent need today. I left a message on his voice mail to have him call back to the message center here. When he does we can give him the scheduling # [phone] for [place] and let him know about Dr. Maile Anderson who is newly available as he just joined in with the [place] practice.Pt returned call and name and number given.Psychiatry referral faxed to [place]

## 2022-02-15 NOTE — TELEPHONE ENCOUNTER
---------------------  From: Sara Duran RN (Phone Messages Pool ([id number]))   To: [employer] Message Pool ([address] [place] [address]);     Sent: 9/17/2021 12:45:23 PM CDT  Subject: Following Up       PCP:   HUNTER      Time of Call:  12:17pm       Person Calling:  Pt  Phone number:  [phone]    Note:   Pt called and left a voicemail to check in with KWL and inform her that the change in medications seems to be going well.  Pt states he wants to continue on this regimen.     Last office visit and reason:  8/11/21 Depression Disease Management---------------------  From: Na Torres MD ([employer] Message Pool ([id number]))   To: Na Torres MD;     Sent: 9/20/2021 10:41:30 AM CDT  Subject: FW: Following Up  ** Submitted: **  Order:DULoxetine (DULoxetine 30 mg oral delayed release capsule)  1 cap(s)  Oral  bid  take 1 caps daily x 1 week, then increase to 2 caps daily  Qty:  180 cap(s)        Refills:  1          Substitutions Allowed     Route To Pharmacy - [id number]    Signed by Na Torres MD  9/20/2021 3:46:00 PM UTCSent in a 6 month supply. Should be seen at that time or at any point if not effective.---------------------  From: Na Torres MD   To: [facility] Message Pool ([id number]);     Sent: 9/20/2021 10:47:30 AM CDT  Subject: RE: Following UpPlease communicate message below to patient.---------------------  From: Na Torres MD ([employer] Message Pool (23027[facility]))   To: Na Torres MD;     Sent: 9/20/2021 11:02:13 AM CDT  Subject: RE: Following Up---------------------  From: Na Torres MD   To: [facility] (32224_[facility]);     Sent: 9/20/2021 11:02:50 AM CDT  Subject: RE: Following UpPatient updated regarding provider's recommendations.    Kevin Cheung LPN

## 2022-02-15 NOTE — NURSING NOTE
Depression Screening Entered On:  8/15/2021 8:41 AM CDT    Performed On:  8/11/2021 8:41 AM CDT by Flory Saavedra               Depression Screening   Little Interest - Pleasure in Activities :   Nearly every day   Feeling Down, Depressed, Hopeless :   Nearly every day   Initial Depression Screen Score :   6 Score   Poor Appetite or Overeating :   More than half the days   Trouble Falling or Staying Asleep :   Several days   Feeling Tired or Little Energy :   Several days   Feeling Bad About Yourself :   Nearly every day   Trouble Concentrating :   Not at all   Moving or Speaking Slowly :   Several days   Thoughts Better Off Dead or Hurting Self :   Nearly every day   Difficulty at Work, Home, Getting Along :   Somewhat difficult   Detailed Depression Screen Score :   11    Total Depression Screen Score :   17    Flory Saavedra - 8/15/2021 8:41 AM CDT

## 2022-03-15 PROBLEM — F32.1 MODERATE MAJOR DEPRESSION (H): Status: ACTIVE | Noted: 2022-03-15

## 2022-03-15 PROBLEM — G51.0 BELL'S PALSY: Status: ACTIVE | Noted: 2022-03-15

## 2022-03-15 RX ORDER — DULOXETIN HYDROCHLORIDE 30 MG/1
CAPSULE, DELAYED RELEASE ORAL
COMMUNITY
Start: 2021-11-08 | End: 2022-03-16

## 2022-03-16 ENCOUNTER — OFFICE VISIT (OUTPATIENT)
Dept: FAMILY MEDICINE | Facility: CLINIC | Age: 22
End: 2022-03-16
Payer: COMMERCIAL

## 2022-03-16 VITALS
DIASTOLIC BLOOD PRESSURE: 85 MMHG | HEART RATE: 107 BPM | OXYGEN SATURATION: 96 % | BODY MASS INDEX: 29.35 KG/M2 | SYSTOLIC BLOOD PRESSURE: 126 MMHG | WEIGHT: 187.4 LBS

## 2022-03-16 DIAGNOSIS — F32.1 MODERATE MAJOR DEPRESSION (H): Primary | ICD-10-CM

## 2022-03-16 PROCEDURE — 96127 BRIEF EMOTIONAL/BEHAV ASSMT: CPT | Performed by: FAMILY MEDICINE

## 2022-03-16 PROCEDURE — 99213 OFFICE O/P EST LOW 20 MIN: CPT | Performed by: FAMILY MEDICINE

## 2022-03-16 RX ORDER — DULOXETIN HYDROCHLORIDE 30 MG/1
CAPSULE, DELAYED RELEASE ORAL
Qty: 270 CAPSULE | Refills: 1 | Status: SHIPPED | OUTPATIENT
Start: 2022-03-16 | End: 2023-09-14 | Stop reason: DRUGHIGH

## 2022-03-16 ASSESSMENT — PATIENT HEALTH QUESTIONNAIRE - PHQ9
SUM OF ALL RESPONSES TO PHQ QUESTIONS 1-9: 12
SUM OF ALL RESPONSES TO PHQ QUESTIONS 1-9: 12
10. IF YOU CHECKED OFF ANY PROBLEMS, HOW DIFFICULT HAVE THESE PROBLEMS MADE IT FOR YOU TO DO YOUR WORK, TAKE CARE OF THINGS AT HOME, OR GET ALONG WITH OTHER PEOPLE: NOT DIFFICULT AT ALL

## 2022-03-16 ASSESSMENT — COLUMBIA-SUICIDE SEVERITY RATING SCALE - C-SSRS
2. IN THE PAST MONTH, HAVE YOU ACTUALLY HAD ANY THOUGHTS OF KILLING YOURSELF?: NO
1. WITHIN THE PAST MONTH, HAVE YOU WISHED YOU WERE DEAD OR WISHED YOU COULD GO TO SLEEP AND NOT WAKE UP?: YES
6. HAVE YOU EVER DONE ANYTHING, STARTED TO DO ANYTHING, OR PREPARED TO DO ANYTHING TO END YOUR LIFE?: NO

## 2022-03-16 NOTE — PROGRESS NOTES
Assessment & Plan     Moderate major depression (H)  Overall patient feels like current regimen is working well.  Notes that he occasionally has thoughts that he would be better off dead but denies any thoughts about self-harm.  No active suicidal thoughts.  He will be resuming counseling next week.  Discussed alcohol use and have recommended that he eliminate if possible.  If he decides that he needs assistance with that he can let me know.  Follow-up in 3 to 6 months, sooner if feels like symptoms are worsening or not improving with counseling.  - DULoxetine (CYMBALTA) 30 MG capsule; Take 2 capsules (60 mg) by mouth every morning AND 1 capsule (30 mg) every evening.             Depression Screening Follow Up    PHQ 3/16/2022   PHQ-9 Total Score 12   Q9: Thoughts of better off dead/self-harm past 2 weeks Several days   F/U: Thoughts of suicide or self-harm No   F/U: Safety concerns No     Last PHQ-9 3/16/2022   1.  Little interest or pleasure in doing things 1   2.  Feeling down, depressed, or hopeless 3   3.  Trouble falling or staying asleep, or sleeping too much 0   4.  Feeling tired or having little energy 1   5.  Poor appetite or overeating 2   6.  Feeling bad about yourself 3   7.  Trouble concentrating 0   8.  Moving slowly or restless 1   Q9: Thoughts of better off dead/self-harm past 2 weeks 1   PHQ-9 Total Score 12   In the past two weeks have you had thoughts of suicide or self harm? No   Do you have concerns about your personal safety or the safety of others? No               Follow Up     Follow Up Actions Taken  Referred patient back to mental health provider    Discussed the following ways the patient can remain in a safe environment:  remove alcohol      Return in about 6 months (around 9/16/2022) for routine follow up and sooner if symptoms are worsening.    Na Torres MD  [facility]    Bonnie Andrade is a 22 year old who presents for the following  health issues     Patient with ongoing issues with depression.  He notes he is here today because his parents noted that he was drinking more heavily than they realized.  Patient notes that he had not been following up with counseling.  He has been trying to cut back on alcohol use.  He was drinking 5-6 drinks every night, mostly hard liquor, and has tried to decrease that intake.  He is still going to work every day.  He denies any suicidal thoughts.  He does sometimes note that it feels like it would be easier if he did not wake up or was not alive.  He notes parents are some supportive but he has a hard time being open with them.  He knows that they had come to me to discuss their concerns.  That was significant as previously they had not wanted him to be aware that they were relaying information to me but realized that they needed to be honest if they were expecting him to be honest  Patient had not been seeing his counselor regularly but has scheduled an appointment for next week.  He is planning on discussing the alcohol use with his counselor.  We discussed other methods that can be helpful to discontinue or decrease alcohol use including the use of naltrexone.  He will consider and let me know if he like to pursue  He does feel like the Cymbalta significantly helping.  He does not think that we need to make any dose adjustments.  He has not noticed any significant side effects.    History of Present Illness       Mental Health Follow-up:  Patient presents to follow-up on Depression.Patient's depression since last visit has been:  Medium  The patient is not having other symptoms associated with depression.      Any significant life events: No  Patient is not feeling anxious or having panic attacks.  Patient has concerns about alcohol or drug use.       Today's PHQ-9         PHQ-9 Total Score: 12  PHQ-9 Q9 Thoughts of better off dead/self-harm past 2 weeks :   (P) Several days  Thoughts of suicide or self  harm: (P) No  Self-harm Plan:     Self-harm Action:       Safety concerns for self or others: (P) No    How difficult have these problems made it for you to do your work, take care of things at home, or get along with other people: Not difficult at all        He eats 2-3 servings of fruits and vegetables daily.He consumes 1 sweetened beverage(s) daily.He exercises with enough effort to increase his heart rate 9 or less minutes per day.  He exercises with enough effort to increase his heart rate 3 or less days per week. He is missing 1 dose(s) of medications per week.         Review of Systems         Objective    /85 (BP Location: Right arm, Patient Position: Sitting)   Pulse 107   Wt 85 kg (187 lb 6.4 oz)   SpO2 96%   BMI 29.35 kg/m    Body mass index is 29.35 kg/m .  Physical Exam     Patient is alert cooperative in no distress  Denies suicidal ideation  Affect is flat  Answers questions appropriately and makes good eye contact

## 2022-03-17 ASSESSMENT — PATIENT HEALTH QUESTIONNAIRE - PHQ9: SUM OF ALL RESPONSES TO PHQ QUESTIONS 1-9: 12

## 2022-04-28 ENCOUNTER — VIRTUAL VISIT (OUTPATIENT)
Dept: FAMILY MEDICINE | Facility: CLINIC | Age: 22
End: 2022-04-28
Payer: COMMERCIAL

## 2022-04-28 DIAGNOSIS — R05.9 COUGH: Primary | ICD-10-CM

## 2022-04-28 PROCEDURE — 99213 OFFICE O/P EST LOW 20 MIN: CPT | Mod: TEL | Performed by: FAMILY MEDICINE

## 2022-04-28 RX ORDER — ALBUTEROL SULFATE 90 UG/1
2 AEROSOL, METERED RESPIRATORY (INHALATION) EVERY 6 HOURS
Qty: 18 G | Refills: 1 | Status: SHIPPED | OUTPATIENT
Start: 2022-04-28 | End: 2023-09-14

## 2022-04-28 RX ORDER — AZITHROMYCIN 250 MG/1
TABLET, FILM COATED ORAL
Qty: 6 TABLET | Refills: 0 | Status: SHIPPED | OUTPATIENT
Start: 2022-04-28 | End: 2022-05-03

## 2022-04-28 ASSESSMENT — ENCOUNTER SYMPTOMS
SHORTNESS OF BREATH: 0
RHINORRHEA: 1
DIZZINESS: 0
COUGH: 1

## 2022-04-28 NOTE — PROGRESS NOTES
Raymond is a 22 year old who is being evaluated via a billable telephone visit.      What phone number would you like to be contacted at? [phone]  How would you like to obtain your AVS? Declined  Called   Assessment & Plan     Cough  Coughing that is continuing over 10 days with a negative COVID test.  He does have a history of allergies that could be contributing to it.  We will try a course of azithromycin along with an inhaler he will use over-the-counter antihistamines and expect marked improvement over this week    6}     Recheck if he is not better or if he is any worse    No follow-ups on file.    Girma Godwin MD  [facility]   Raymond is a 22 year old who presents for the following health issues     He caught a cold and thought he would just get over it instead has moved into his lungs with coughing.  He is not running fevers or chills.  He does not feel real short of breath but he does cough when he is more active.  He did do a COVID test and it was negative this morning he has had no exposures to other illnesses that he knows of.         Concern - Cough  Onset: 4-21-22  Description: wet productive cough worse in mornings, gets dry near the end of the day.  Negative covid test today.  Intensity: moderate  Progression of Symptoms:  same  Accompanying Signs & Symptoms: fatigued the first few days  Previous history of similar problem: n/a  Precipitating factors:        Worsened by: none  Alleviating factors:        Improved by: dayquil  Therapies tried and outcome: dayquil somewhat effective.    Review of Systems   HENT: Positive for congestion, postnasal drip and rhinorrhea.    Respiratory: Positive for cough. Negative for shortness of breath.    Cardiovascular: Negative for chest pain.   Neurological: Negative for dizziness.            Objective           Vitals:  No vitals were obtained today due to virtual visit.    Physical Exam  Pulmonary:       Comments: Normal voice and speech  Neurological:      Mental Status: He is alert.   Psychiatric:         Behavior: Behavior normal.         Thought Content: Thought content normal.         Judgment: Judgment normal.                        Phone call duration: 20 minutes from my office to his home ending at 2:50 PM

## 2022-05-10 ENCOUNTER — OFFICE VISIT (OUTPATIENT)
Dept: FAMILY MEDICINE | Facility: CLINIC | Age: 22
End: 2022-05-10
Payer: COMMERCIAL

## 2022-05-10 ENCOUNTER — ANCILLARY PROCEDURE (OUTPATIENT)
Dept: GENERAL RADIOLOGY | Facility: CLINIC | Age: 22
End: 2022-05-10
Attending: PHYSICIAN ASSISTANT
Payer: COMMERCIAL

## 2022-05-10 VITALS
SYSTOLIC BLOOD PRESSURE: 133 MMHG | RESPIRATION RATE: 12 BRPM | DIASTOLIC BLOOD PRESSURE: 86 MMHG | WEIGHT: 194 LBS | OXYGEN SATURATION: 96 % | HEART RATE: 119 BPM | BODY MASS INDEX: 30.38 KG/M2 | TEMPERATURE: 98.3 F

## 2022-05-10 DIAGNOSIS — R05.3 COUGH, PERSISTENT: Primary | ICD-10-CM

## 2022-05-10 DIAGNOSIS — R05.3 COUGH, PERSISTENT: ICD-10-CM

## 2022-05-10 PROCEDURE — 71046 X-RAY EXAM CHEST 2 VIEWS: CPT | Mod: TC | Performed by: RADIOLOGY

## 2022-05-10 PROCEDURE — 99213 OFFICE O/P EST LOW 20 MIN: CPT | Performed by: PHYSICIAN ASSISTANT

## 2022-05-10 RX ORDER — PREDNISONE 20 MG/1
20 TABLET ORAL DAILY
Qty: 7 TABLET | Refills: 0 | Status: SHIPPED | OUTPATIENT
Start: 2022-05-10 | End: 2023-09-14

## 2022-05-10 ASSESSMENT — PATIENT HEALTH QUESTIONNAIRE - PHQ9
SUM OF ALL RESPONSES TO PHQ QUESTIONS 1-9: 15
SUM OF ALL RESPONSES TO PHQ QUESTIONS 1-9: 15
10. IF YOU CHECKED OFF ANY PROBLEMS, HOW DIFFICULT HAVE THESE PROBLEMS MADE IT FOR YOU TO DO YOUR WORK, TAKE CARE OF THINGS AT HOME, OR GET ALONG WITH OTHER PEOPLE: NOT DIFFICULT AT ALL

## 2022-05-10 ASSESSMENT — ENCOUNTER SYMPTOMS
SORE THROAT: 1
CONSTITUTIONAL NEGATIVE: 1
SINUS PRESSURE: 0
SINUS PAIN: 0
WHEEZING: 0
SHORTNESS OF BREATH: 0
RHINORRHEA: 1
COUGH: 1

## 2022-05-10 NOTE — LETTER
May 10, 2022      Raymond Suárez  [address] [place] [id number]        To Whom It May Concern:    Raymond KIKI Suárez was seen in our clinic. He may return to work without restrictions.      Sincerely,        NERI Kamara

## 2022-05-10 NOTE — PROGRESS NOTES
Assessment & Plan     Cough, persistent  Worseing  - XR Chest 2 Views  - predniSONE (DELTASONE) 20 MG tablet  Dispense: 7 tablet; Refill: 0  Follow-up if not better by Monday and may consider another round of antibiotics.             Depression Screening Follow Up    PHQ 5/10/2022   PHQ-9 Total Score 15   Q9: Thoughts of better off dead/self-harm past 2 weeks Several days   F/U: Thoughts of suicide or self-harm No   F/U: Safety concerns No                 Follow Up    Follow Up Actions Taken      Discussed the following ways the patient can remain in a safe environment:        No follow-ups on file.    NERI Kamara  [facility]    Bonnie Andrade is a 22 year old who presents for the following health issues     22-year-old presents to the clinic to recheck a cough  He has had a cough for about the past 3 to 4 weeks  Cough sounds wet but usually nonproductive  About a week ago he had a prescription for Zithromax he is finished that cough persists cough usually does not keep him up at night  No fever no chills  Negative COVID testing at home  No sick contacts  Has been able to work  He has albuterol inhaler it is minimally helpful  He has no history of having a recurrent chronic cough after URIs    Cymbalta working well    History of Present Illness       Mental Health Follow-up:                    Today's PHQ-9         PHQ-9 Total Score: 15  PHQ-9 Q9 Thoughts of better off dead/self-harm past 2 weeks :   (P) Several days  Thoughts of suicide or self harm: (P) No  Self-harm Plan:     Self-harm Action:       Safety concerns for self or others: (P) No    How difficult have these problems made it for you to do your work, take care of things at home, or get along with other people: Not difficult at all        Reason for visit:  Long lasting cough and lung pain  Symptom onset:  3-4 weeks ago  Symptoms include:  Wet cough, pain in chest/lung area  Symptom intensity:   Moderate  Symptom progression:  Staying the same  Had these symptoms before:  No  What makes it better:  Inhaler    He eats 0-1 servings of fruits and vegetables daily.He consumes 1 sweetened beverage(s) daily.He exercises with enough effort to increase his heart rate 9 or less minutes per day.  He exercises with enough effort to increase his heart rate 3 or less days per week. He is missing 1 dose(s) of medications per week.  He is not taking prescribed medications regularly due to remembering to take.             Review of Systems   Constitutional: Negative.    HENT: Positive for rhinorrhea and sore throat. Negative for ear pain, sinus pressure, sinus pain and sneezing.    Respiratory: Positive for cough. Negative for shortness of breath and wheezing.             Objective    /86 (BP Location: Right arm, Patient Position: Sitting, Cuff Size: Adult Large)   Pulse 119   Temp 98.3  F (36.8  C) (Tympanic)   Resp 12   Wt 88 kg (194 lb)   SpO2 96%   BMI 30.38 kg/m    Body mass index is 30.38 kg/m .  Physical Exam  Vitals and nursing note reviewed.   Constitutional:       Appearance: Normal appearance.   HENT:      Right Ear: Tympanic membrane normal.      Nose: Nose normal.      Mouth/Throat:      Mouth: Mucous membranes are moist.   Eyes:      Conjunctiva/sclera: Conjunctivae normal.   Cardiovascular:      Rate and Rhythm: Regular rhythm.      Heart sounds: Normal heart sounds.   Pulmonary:      Effort: Pulmonary effort is normal. No respiratory distress.      Breath sounds: Normal breath sounds. No wheezing or rhonchi.   Chest:      Chest wall: No tenderness.   Musculoskeletal:      Cervical back: Normal range of motion and neck supple.   Lymphadenopathy:      Cervical: No cervical adenopathy.   Neurological:      Mental Status: He is alert.      CXR appears clear. Will contact if any other findings.

## 2022-05-11 ASSESSMENT — PATIENT HEALTH QUESTIONNAIRE - PHQ9: SUM OF ALL RESPONSES TO PHQ QUESTIONS 1-9: 15

## 2022-05-29 ENCOUNTER — HEALTH MAINTENANCE LETTER (OUTPATIENT)
Age: 22
End: 2022-05-29

## 2022-10-03 ENCOUNTER — HEALTH MAINTENANCE LETTER (OUTPATIENT)
Age: 22
End: 2022-10-03

## 2023-06-04 ENCOUNTER — HEALTH MAINTENANCE LETTER (OUTPATIENT)
Age: 23
End: 2023-06-04

## 2023-09-14 ENCOUNTER — OFFICE VISIT (OUTPATIENT)
Dept: FAMILY MEDICINE | Facility: CLINIC | Age: 23
End: 2023-09-14
Payer: COMMERCIAL

## 2023-09-14 VITALS
HEIGHT: 68 IN | WEIGHT: 191.1 LBS | SYSTOLIC BLOOD PRESSURE: 102 MMHG | OXYGEN SATURATION: 94 % | BODY MASS INDEX: 28.96 KG/M2 | RESPIRATION RATE: 14 BRPM | DIASTOLIC BLOOD PRESSURE: 80 MMHG | HEART RATE: 86 BPM

## 2023-09-14 DIAGNOSIS — F32.1 MODERATE MAJOR DEPRESSION (H): ICD-10-CM

## 2023-09-14 DIAGNOSIS — F10.20 ALCOHOLISM (H): Primary | ICD-10-CM

## 2023-09-14 PROCEDURE — 99213 OFFICE O/P EST LOW 20 MIN: CPT | Mod: 25 | Performed by: FAMILY MEDICINE

## 2023-09-14 PROCEDURE — 90686 IIV4 VACC NO PRSV 0.5 ML IM: CPT | Performed by: FAMILY MEDICINE

## 2023-09-14 PROCEDURE — 90471 IMMUNIZATION ADMIN: CPT | Performed by: FAMILY MEDICINE

## 2023-09-14 RX ORDER — DULOXETIN HYDROCHLORIDE 60 MG/1
120 CAPSULE, DELAYED RELEASE ORAL DAILY
COMMUNITY
Start: 2023-06-26

## 2023-09-14 RX ORDER — NALTREXONE HYDROCHLORIDE 50 MG/1
50 TABLET, FILM COATED ORAL
Status: CANCELLED | OUTPATIENT
Start: 2023-09-14

## 2023-09-14 RX ORDER — NALTREXONE HYDROCHLORIDE 50 MG/1
1 TABLET, FILM COATED ORAL
COMMUNITY
Start: 2023-06-26

## 2023-09-14 ASSESSMENT — PATIENT HEALTH QUESTIONNAIRE - PHQ9
SUM OF ALL RESPONSES TO PHQ QUESTIONS 1-9: 13
SUM OF ALL RESPONSES TO PHQ QUESTIONS 1-9: 13
10. IF YOU CHECKED OFF ANY PROBLEMS, HOW DIFFICULT HAVE THESE PROBLEMS MADE IT FOR YOU TO DO YOUR WORK, TAKE CARE OF THINGS AT HOME, OR GET ALONG WITH OTHER PEOPLE: SOMEWHAT DIFFICULT

## 2023-09-14 NOTE — PROGRESS NOTES
"  Assessment & Plan     Alcoholism (H)  Patient on naltrexone and able to remain abstinent from alcohol.  [facility] to [facility].  Discussed option of injectable Vivitrol but it is not something that we have available here.  Patient is doing well on naltrexone and has not considered cost so at this point has decided to remain on oral medication and continue to follow-up with [facility].  Discussed option of lab work here versus doing express lab.  He will plan on doing express lab draw.    Moderate major depression (H)  PHQ score remains elevated, patient is following regularly with psychology.  Reports medication is helping.  Overall feeling like he is slowly improving.             BMI:   Estimated body mass index is 29.2 kg/m  as calculated from the following:    Height as of this encounter: 1.723 m (5' 7.84\").    Weight as of this encounter: 86.7 kg (191 lb 1.6 oz).       Depression Screening Follow Up        9/14/2023    10:15 AM   PHQ   PHQ-9 Total Score 13   Q9: Thoughts of better off dead/self-harm past 2 weeks Several days   F/U: Thoughts of suicide or self-harm No   F/U: Safety concerns No         9/14/2023    10:15 AM   Last PHQ-9   1.  Little interest or pleasure in doing things 3   2.  Feeling down, depressed, or hopeless 3   3.  Trouble falling or staying asleep, or sleeping too much 0   4.  Feeling tired or having little energy 1   5.  Poor appetite or overeating 1   6.  Feeling bad about yourself 3   7.  Trouble concentrating 1   8.  Moving slowly or restless 0   Q9: Thoughts of better off dead/self-harm past 2 weeks 1   PHQ-9 Total Score 13   In the past two weeks have you had thoughts of suicide or self harm? No   Do you have concerns about your personal safety or the safety of others? No               Follow Up    Follow Up Actions Taken  Referred patient back to mental health provider    Discussed the following ways the patient can remain in " a safe environment:  be around others      Na Torres MD  [facility]    Bonnie Andrade is a 23 year old, presenting for the following health issues:  Medication Request        9/14/2023    10:36 AM   Additional Questions   Roomed by Cherrie SMITH CMA   Accompanied by None         Patient is here for follow-up of alcoholism and depression.  He is wondering about injectable Vivitrol.  He is currently on oral naltrexone through [facility] and has been effective.  He thinks that injectable may be more convenient.  Discussed that that is not something we have available here but I am happy to help him find.  He has not looked into whether insurance would cover it.  If he wants further assistance let me know but otherwise we will stick with naltrexone.  Continues on duloxetine for his mood.  Depression continues to be an issue but he is working through his psychologist office.  Denies suicidal ideation.  Would like to pursue basic lab testing but has concerns about cost.  We discussed the option of express labs where he can present and ask for lab work to be drawn.  He is agreeable with pursuing that.    History of Present Illness       Mental Health Follow-up:  Patient presents to follow-up on Depression.Patient's depression since last visit has been:  No change  The patient is having other symptoms associated with depression.      Any significant life events: No  Patient is not feeling anxious or having panic attacks.  Patient has no concerns about alcohol or drug use.    Reason for visit:  Medication change and blood work?    He eats 2-3 servings of fruits and vegetables daily.He consumes 0 sweetened beverage(s) daily.He exercises with enough effort to increase his heart rate 9 or less minutes per day.  He exercises with enough effort to increase his heart rate 3 or less days per week. He is missing 1 dose(s) of medications per week.               Review of  "Systems         Objective    /80   Pulse 86   Resp 14   Ht 1.723 m (5' 7.84\")   Wt 86.7 kg (191 lb 1.6 oz)   SpO2 94%   BMI 29.20 kg/m    Body mass index is 29.2 kg/m .  Physical Exam   GENERAL: healthy, alert and no distress  PSYCH: mentation appears normal, affect normal/bright                      "

## 2023-10-02 ENCOUNTER — TELEPHONE (OUTPATIENT)
Dept: FAMILY MEDICINE | Facility: CLINIC | Age: 23
End: 2023-10-02
Payer: COMMERCIAL

## 2023-10-02 NOTE — TELEPHONE ENCOUNTER
General Call    Contacts         Type Contact Phone/Fax    10/02/2023 11:45 AM CDT Phone (Incoming) Raymond Suárez (Self) [phone] (M)          Reason for Call:  requesting lab results    What are your questions or concerns:  pt stated he paid out of pocket for labs to be drawn a couple of weeks ago and is requesting these lab results.    Date of last appointment with provider: 9/14/23    Could we send this information to you in [employer] or would you prefer to receive a phone call?:   Patient would prefer a phone call   Okay to leave a detailed message?: Yes at Cell number on file:    Telephone Information:   Mobile [phone]

## 2023-10-02 NOTE — TELEPHONE ENCOUNTER
Spoke with patient and let him know copy of results are at the  for him to . Also let him know that he needs to leave copy for Dr. Torres to review and schedule follow up appointment with her.

## 2023-10-02 NOTE — TELEPHONE ENCOUNTER
Call with pt, informed the results were mailed 9/14/23. Address confirmed. Pt will  results. Pt requesting to be given results verbally as they are needed for an appointment today.  Coordination with  staff and is ready for .

## 2023-11-29 NOTE — TELEPHONE ENCOUNTER
---------------------  From: Brian CHAN, Na   To: Enma GRAHAM, Leann BERNAL;     Sent: 11/8/2021 9:06:11 AM CST  Subject: psychiatry referral     Patient with depression, suicidal thoughts but no plan, and alcohol abuse. Trying to get him in with psychiatry in the area but not having luck. He is willing to travel to [facility]. He has a counselor that he has seen intermittently. I think depression is the primary issue but the alcohol use will also need to be addressed. I'm having Isis fax referral to [facility] this morning. Patient is still working, has support from parents. I increased his duloxetine dose today.     Can you touch base with him later this week - mornings usually work better - and make sure the referral is being processed. If there are ways to get him help more quickly, you could let him know. Happy to talk with you about the situation is needed.  
---------------------  From: Na Torres MD   To: Appointment Pool ([id number]);     Sent: 11/2/2021 3:26:40 PM CDT !  Subject: schedule change     Please reach out to patient. If he would like to do his video visit tomorrow or Thursday morning, OK to work in with me. Otherwise, I will see him next Monday. Clive garcia for pt  
---------------------  From: Na Torres MD   To: Appointment Pool ([id number]);     Sent: 11/8/2021 10:00:53 AM CST  Subject: follow up appointment     Please call the patient to schedule a follow up video visit with me in 2-3 weeks. OK to work in. He is usually able to be reached before 11am. OK to call later this week.LEFT MESSAGE X1pt saw HUNTER on 11.8.21  
Average build

## 2023-12-12 NOTE — COMMUNITY RESOURCES LIST (ENGLISH)
12/12/2023   [facility] [employer]  N/A  For questions about this resource list or additional care needs, please contact your primary care clinic or care manager.  Phone: [phone]   Email: N/A   Address: [address] [place] [id number]   Hours: N/A        Food and Nutrition       Food pantry  1  [facility] Service Extension Unit - Hotline Distance: 0.36 miles      Phone/Virtual   [address] [place] [address]  Language: English  Hours: Mon - Sun Open 24 Hours  Fees: Free   Phone: (701) 409-9748 Website: [email or web address]-[email or web address]/     2  [facility] Food Pantry - Food Shelf Distance: 0.74 miles      Pickup   [address] [place] [address]  Language: English  Hours: Mon 4:00 PM - 6:00 PM , Tue 9:30 AM - 12:00 PM , Wed 4:00 PM - 6:00 PM , Thu 9:30 AM - 12:00 PM , Fri Appt. Only  Fees: Free   Phone: (920) 963-2105 Email: jess@[employer] Website: http://[employer]     SNAP application assistance  3  [employer]force [facility] Distance: 11.72 miles      In-Person, Phone/Virtual   [address] [place] [address]  Language: English, Hmong, Bangladeshi, Khmer  Hours: Mon - Fri 8:00 AM - 4:30 PM  Fees: Free   Phone: (755) 938-9031 Website: https://www.workforceresource.org/     Soup kitchen or free meals  4  [employer] Distance: 11.16 miles      In-Person   [address] [place] [address]  Language: English  Hours: Sun 12:00 PM - 1:30 PM  Fees: Free   Phone: (279) 301-4911 Email: krishdchurch@[employer] Website: https://[email or web address].com/Dannie/     5  [facility] - Breakfast on the Run Distance: 11.25 miles      Pickup   [address] [place] [address]  Language: English  Hours: Sat 9:00 AM - 10:00 AM  Fees: Free   Phone: (329) 728-3069 Email:  office@[facility] Website: http://[facility]          Housing       Housing search assistance  6  [employer] Distance: 10.7 miles      In-Person, Phone/Virtual   [address] [place] [address]  Language: English  Hours: Mon - Fri 10:00 AM - 4:00 PM  Fees: Free   Phone: (605) 137-3023 Email: claudia@[employer] Website: http://www.[facility].[employer]          Important Numbers & Websites       Emergency Services   911  [facility] Services   311  Poison Control   (871) 266-3747  Suicide Prevention Lifeline   (457) 568-3636 (TALK)  Child Abuse Hotline   (344) 243-1785 (4-A-Child)  Sexual Assault Hotline   (607) 480-9836 (HOPE)  National Runaway Safeline   (970) 824-6143 (RUNAWAY)  All-Options Talkline   (812) 857-4928  Substance Abuse Referral   (644) 116-9112 (HELP)

## 2023-12-14 ENCOUNTER — OFFICE VISIT (OUTPATIENT)
Dept: FAMILY MEDICINE | Facility: CLINIC | Age: 23
End: 2023-12-14
Payer: COMMERCIAL

## 2023-12-14 VITALS
HEIGHT: 67 IN | RESPIRATION RATE: 16 BRPM | TEMPERATURE: 97.5 F | OXYGEN SATURATION: 98 % | DIASTOLIC BLOOD PRESSURE: 84 MMHG | HEART RATE: 90 BPM | WEIGHT: 184 LBS | BODY MASS INDEX: 28.88 KG/M2 | SYSTOLIC BLOOD PRESSURE: 120 MMHG

## 2023-12-14 DIAGNOSIS — G89.29 CHRONIC BILATERAL THORACIC BACK PAIN: Primary | ICD-10-CM

## 2023-12-14 DIAGNOSIS — M54.6 CHRONIC BILATERAL THORACIC BACK PAIN: Primary | ICD-10-CM

## 2023-12-14 PROCEDURE — 99213 OFFICE O/P EST LOW 20 MIN: CPT | Performed by: NURSE PRACTITIONER

## 2023-12-14 RX ORDER — CYCLOBENZAPRINE HCL 10 MG
10 TABLET ORAL 3 TIMES DAILY PRN
Qty: 30 TABLET | Refills: 0 | Status: SHIPPED | OUTPATIENT
Start: 2023-12-14

## 2023-12-14 ASSESSMENT — PATIENT HEALTH QUESTIONNAIRE - PHQ9
SUM OF ALL RESPONSES TO PHQ QUESTIONS 1-9: 12
10. IF YOU CHECKED OFF ANY PROBLEMS, HOW DIFFICULT HAVE THESE PROBLEMS MADE IT FOR YOU TO DO YOUR WORK, TAKE CARE OF THINGS AT HOME, OR GET ALONG WITH OTHER PEOPLE: SOMEWHAT DIFFICULT
SUM OF ALL RESPONSES TO PHQ QUESTIONS 1-9: 12

## 2023-12-14 NOTE — PROGRESS NOTES
"  Assessment & Plan     (M54.6,  G89.29) Chronic bilateral thoracic back pain  (primary encounter diagnosis)  Comment: Reassured him that this is not his lungs and supported the fact that he no longer vapes or smokes.  Counseled that he is likely deconditioned and only 23 years old and needs to work into more of an aggressive aerobic workout as he is only lifting weights at this time and walking his dog.  Reviewed exercises he can use or he can call where he wants a physical therapy prescription sent  Plan: Physical Therapy Referral, cyclobenzaprine         (FLEXERIL) 10 MG tablet                     BMI:   Estimated body mass index is 28.82 kg/m  as calculated from the following:    Height as of this encounter: 1.702 m (5' 7\").    Weight as of this encounter: 83.5 kg (184 lb).           Criss Craig NP  [facility]    Bonnie Andrade is a 23 year old, presenting for the following health issues:      Feels a thousand needles in back for many years, sweeping makes it worse. He has to stop sweeping  and it will calm down but sometimes lasts into the evening.  Denies hx of injury.Any type of physical activity can trigger it, mostly twisting  Worried it is his lungs  He feels the pain  Has SOB with activities. Used to play sports but can't do that now.    Normal CXR 18 months ago.  Given albuterol and used consist. But it didn't help so stopped  No cough    Hx of vaping 2-3 years ago, stopped.  If he takes a hit of anything now, it is really painful.  He is not in a routine exercise routine    He walks his dog and triggers pain in back  lung pain (Bilateral. Pain daily x 2 years. Certain motions aggravate. Xrays done 6 mo ago and negative. )        12/14/2023     8:17 AM   Additional Questions   Roomed by LA       History of Present Illness       Reason for visit:  Pain in lungs  Symptom onset:  More than a month  Symptoms include:  Sharp pain in lungs, worsens throughout the day and " "with exercise  Symptom intensity:  Mild  Symptom progression:  Worsening  Had these symptoms before:  Yes  Has tried/received treatment for these symptoms:  Yes  Previous treatment was successful:  No  What makes it worse:  Exercise  What makes it better:  No    He eats 2-3 servings of fruits and vegetables daily.He consumes 1 sweetened beverage(s) daily.He exercises with enough effort to increase his heart rate 20 to 29 minutes per day.  He exercises with enough effort to increase his heart rate 4 days per week.   He is taking medications regularly.                 Review of Systems         Objective    /84 (BP Location: Right arm, Patient Position: Sitting, Cuff Size: Adult Large)   Pulse 90   Temp 97.5  F (36.4  C) (Tympanic)   Resp 16   Ht 1.702 m (5' 7\")   Wt 83.5 kg (184 lb)   SpO2 98%   BMI 28.82 kg/m    Body mass index is 28.82 kg/m .  Physical Exam   Alert no acute distress skin is warm pink dry no rash  This is report of pain when it happens is the upper to mid back bilaterally on both sides.  I cannot trigger discomfort with lumbar flexion or extension or twisting side-to-side today.  Lungs are clear bilaterally heart regular rate rhythm neck supple cervical adenopathy    Reviewed chest x-ray results from your abdominal                      "

## 2023-12-14 NOTE — COMMUNITY RESOURCES LIST (ENGLISH)
12/14/2023   [facility]  N/A  For questions about this resource list or additional care needs, please contact your primary care clinic or care manager.  Phone: [phone]   Email: N/A   Address: [address] [place] [id number]   Hours: N/A        Food and Nutrition       Food pantry  1  [facility] Service Extension Unit - Hotline Distance: 0.36 miles      Phone/Virtual   [address] [place] [address]  Language: English  Hours: Mon - Sun Open 24 Hours  Fees: Free   Phone: (773) 460-5191 Website: [email or web address]/wu/[facility]-service-extension/     2  [facility] - Food Shelf Distance: 0.74 miles      Pickup   [address] [place] [address]  Language: English  Hours: Mon 4:00 PM - 6:00 PM , Tue 9:30 AM - 12:00 PM , Wed 4:00 PM - 6:00 PM , Thu 9:30 AM - 12:00 PM , Fri Appt. Only  Fees: Free   Phone: (957) 640-1053 Email: jess@[employer] Website: http://[employer].WebEvents     SNAP application assistance  3  Workforce [facility] Distance: 11.72 miles      In-Person, Phone/Virtual   [address] [place] [address]  Language: English, Hmong, Uruguayan, Tamazight  Hours: Mon - Fri 8:00 AM - 4:30 PM  Fees: Free   Phone: (662) 329-2130 Website: https://www.workforceresource.org/     Soup kitchen or free meals  4  [employer] Distance: 11.16 miles      In-Person   [address] [place] [address]  Language: English  Hours: Sun 12:00 PM - 1:30 PM  Fees: Free   Phone: (388) 868-2112 Email: [email or web address].WebEvents Website: [email or web address]/     5  [facility] - [employer] on the [employer] Distance: 11.25 miles      Pickup   [address] [place] [address]  Language: English  Hours: Sat 9:00 AM - 10:00 AM  Fees: Free   Phone: (848) 400-7707 Email:  office@[facility] Website: http://[facility]          Housing       Housing search assistance  6  [employer] Distance: 10.7 miles      In-Person, Phone/Virtual   [address] [place] [address]  Language: English  Hours: Mon - Fri 10:00 AM - 4:00 PM  Fees: Free   Phone: (295) 669-3363 Email: [email or web address]@[email or web address] Website: http://www.[facility]          Important Numbers & Websites       Emergency Services   911  [facility] Services   311  Poison Control   (250) 362-7039  Suicide Prevention Lifeline   (261) 189-5625 (TALK)  Child Abuse Hotline   (360) 555-2384 (4-A-Child)  Sexual Assault Hotline   (557) 861-5863 (HOPE)  National Runaway Safeline   (755) 595-4088 (RUNAWAY)  All-Options Talkline   (314) 133-4041  Substance Abuse Referral   (930) 150-2530 (HELP)

## 2025-03-13 NOTE — PROGRESS NOTES
Patient:   ANTONIA SOUTH            MRN: [id number]            FIN: [id number]               Age:   19 years     Sex:  Male     :  2000   Associated Diagnoses:   Anxiety with depression; Moderate major depression   Author:   Na Torres MD      Chief Complaint   2019 9:05 AM CDT    follow-up depression; things are going good; wart on left foot        Interval History   patient here for depression and anxiety follow up  notes depression has been better controlled, overall feeling improved, still some anxiety, wondering about increasing buspar dose  treated wart at home, will follow up if not improving, just started treatment, wart present for 2 months      Health Status   Allergies:    Allergic Reactions (All)  No known allergies  No Known Medication Allergies   Medications:  (Selected)   Prescriptions  Prescribed  Wellbutrin  mg/24 hours oral tablet, extended release: = 1 tab(s) ( 150 mg ), Oral, q 24 hrs, # 90 tab(s), 3 Refill(s), Type: Maintenance, Pharmacy: [facility] #[id number], 1 tab(s) Oral q 24 hrs  busPIRone 10 mg oral tablet: = 1 tab(s) ( 10 mg ), Oral, bid, # 180 tab(s), 3 Refill(s), Type: Maintenance, Pharmacy: [facility] #[id number], 1 tab(s) Oral bid   Problem list:    All Problems  Bell's Palsy / ICD-9-.0 / Confirmed  Moderate major depression / SNOMED CT 6261408 / Confirmed      Histories   Past Medical History:    Active  Bell's Palsy (ICD-9-.0)   Family History:    Alive and well  Mother (Jennifer)  Father (Tristin)     Procedure history:    PE tube on 5/10/2006 at 6 Years.  Comments:  2010 11:31 AM [facility] - Leigh Ann Boudreaux   Social History:        Alcohol Assessment: Denies Alcohol Use            Never      Tobacco Assessment: Denies Tobacco Use            Electronic Cigarettes      Substance Abuse Assessment: Current            Marijuana      Employment and Education Assessment            Student      Home and Environment Assessment            Lives  [FreeTextEntry1] : The patient returns 3 weeks following double incision top surgery via infra-areolar approach. Denies any significant issues. Feeling well overall.  Pathology demonstrated benign findings, reviewed with patient. with Father, Mother, Siblings.      Nutrition and Health Assessment            Type of diet: Regular.      Exercise and Physical Activity Assessment: Does not exercise      Physical Examination   Vital Signs   7/26/2019 9:05 AM CDT Peripheral Pulse Rate 70 bpm    HR Method Manual    Systolic Blood Pressure 122 mmHg    Diastolic Blood Pressure 62 mmHg    Mean Arterial Pressure 82 mmHg    BP Method Manual      Measurements from flowsheet : Measurements   7/26/2019 9:05 AM CDT Height Measured - Standard 67 in    Weight Measured - Standard 130.6 lb    BSA 1.67 m2    Body Mass Index 20.45 kg/m2      General:  Alert and oriented, No acute distress.    Psychiatric:  Cooperative, Appropriate mood & affect, Normal judgment, Non-suicidal.       Impression and Plan   Diagnosis     Anxiety with depression (TFQ20-UC F41.8).     Moderate major depression (PFK97-PC F32.1).     Course:  Improving.    Orders     Orders (Selected)   Outpatient Orders  Modify  Return to Clinic (Request): RFV: Depression med check, Return in 12 months  Prescriptions  Prescribed  Wellbutrin  mg/24 hours oral tablet, extended release: = 1 tab(s) ( 150 mg ), Oral, q 24 hrs, # 90 tab(s), 3 Refill(s), Type: Maintenance, Pharmacy: [id number], 1 tab(s) Oral q 24 hrs  busPIRone 10 mg oral tablet: = 1 tab(s) ( 10 mg ), Oral, bid, # 180 tab(s), 3 Refill(s), Type: Maintenance, Pharmacy: [facility] #[id number], 1 tab(s) Oral bid.

## 2025-06-30 ENCOUNTER — PATIENT OUTREACH (OUTPATIENT)
Dept: CARE COORDINATION | Facility: CLINIC | Age: 25
End: 2025-06-30
Payer: COMMERCIAL

## 2025-08-31 ENCOUNTER — HEALTH MAINTENANCE LETTER (OUTPATIENT)
Age: 25
End: 2025-08-31